# Patient Record
Sex: MALE | Race: BLACK OR AFRICAN AMERICAN | Employment: OTHER | URBAN - METROPOLITAN AREA
[De-identification: names, ages, dates, MRNs, and addresses within clinical notes are randomized per-mention and may not be internally consistent; named-entity substitution may affect disease eponyms.]

---

## 2017-10-23 ENCOUNTER — OFFICE VISIT (OUTPATIENT)
Dept: INTERNAL MEDICINE CLINIC | Facility: CLINIC | Age: 72
End: 2017-10-23

## 2017-10-23 VITALS
SYSTOLIC BLOOD PRESSURE: 136 MMHG | HEIGHT: 67 IN | DIASTOLIC BLOOD PRESSURE: 84 MMHG | WEIGHT: 191 LBS | BODY MASS INDEX: 29.98 KG/M2 | TEMPERATURE: 97 F | HEART RATE: 76 BPM | RESPIRATION RATE: 16 BRPM

## 2017-10-23 DIAGNOSIS — I70.0 ATHEROSCLEROSIS OF AORTA (HCC): ICD-10-CM

## 2017-10-23 DIAGNOSIS — Z00.00 PHYSICAL EXAM, ANNUAL: Primary | ICD-10-CM

## 2017-10-23 DIAGNOSIS — N28.1 KIDNEY CYST, ACQUIRED: ICD-10-CM

## 2017-10-23 DIAGNOSIS — D69.6 THROMBOCYTOPENIA (HCC): ICD-10-CM

## 2017-10-23 DIAGNOSIS — M21.619 BUNION: ICD-10-CM

## 2017-10-23 DIAGNOSIS — E78.2 MIXED HYPERLIPIDEMIA: ICD-10-CM

## 2017-10-23 DIAGNOSIS — K40.90 RIGHT INGUINAL HERNIA: ICD-10-CM

## 2017-10-23 DIAGNOSIS — Z12.5 SCREENING FOR PROSTATE CANCER: ICD-10-CM

## 2017-10-23 PROBLEM — M54.30 SCIATICA: Status: RESOLVED | Noted: 2017-10-23 | Resolved: 2017-10-23

## 2017-10-23 PROCEDURE — G0439 PPPS, SUBSEQ VISIT: HCPCS | Performed by: INTERNAL MEDICINE

## 2017-10-23 NOTE — PROGRESS NOTES
Leigh Angelo is a 74year old male who presents for a Medicare Annual Wellness visit.     Patient Care Team: Patient Care Team:  Christa Mitchell MD as PCP - General (Internal Medicine)    Patient Active Problem List:     Bunion     Special screening lost more than 10 pounds without trying?: 2 - No    Has your appetite been poor?: No    Type of Diet: Balanced    How does the patient maintain a good energy level?: Appropriate Exercise    How would you describe your daily physical activity?: Moderate SERVICES  INDICATIONS AND SCHEDULE Internal Lab or Procedure External Lab or Procedure   Diabetes Screening      HbgA1C   Annually No results found for: A1C No flowsheet data found.     Fasting Blood Sugar (FSB)Annually   GLUCOSE (P) (mg/dL)   Date Value data found. Digoxin Serum Conc  Annually No results found for: DIGOXIN No flowsheet data found. Diabetes      HgbA1C  Annually No results found for: A1C No flowsheet data found.     Creat/alb ratio  Annually      LDL  Annually LDL Cholesterol (mg/dL) > BP Readings from Last 3 Encounters:  10/23/17 : 136/84  07/07/16 : 130/78  07/06/15 : 143/73      GENERAL: well developed, well nourished, in no apparent distress  SKIN: no rashes, no suspicious lesions  HEENT: atraumatic, normocephalic, ears and th strength, burn calories and help to achieve ideal body weight.    - CBC WITH DIFFERENTIAL WITH PLATELET; Future  - COMP METABOLIC PANEL (14); Future  - LIPID PANEL; Future  - ASSAY, THYROID STIM HORMONE; Future  - URINALYSIS, ROUTINE; Future    3.  Thromboc

## 2017-10-26 ENCOUNTER — LAB ENCOUNTER (OUTPATIENT)
Dept: LAB | Facility: HOSPITAL | Age: 72
End: 2017-10-26
Attending: INTERNAL MEDICINE
Payer: MEDICARE

## 2017-10-26 DIAGNOSIS — E78.2 MIXED HYPERLIPIDEMIA: ICD-10-CM

## 2017-10-26 DIAGNOSIS — Z12.5 SCREENING FOR PROSTATE CANCER: ICD-10-CM

## 2017-10-26 PROCEDURE — 81003 URINALYSIS AUTO W/O SCOPE: CPT

## 2017-10-26 PROCEDURE — 80053 COMPREHEN METABOLIC PANEL: CPT

## 2017-10-26 PROCEDURE — 36415 COLL VENOUS BLD VENIPUNCTURE: CPT

## 2017-10-26 PROCEDURE — 84443 ASSAY THYROID STIM HORMONE: CPT

## 2017-10-26 PROCEDURE — 85025 COMPLETE CBC W/AUTO DIFF WBC: CPT

## 2017-10-26 PROCEDURE — 80061 LIPID PANEL: CPT

## 2017-11-02 ENCOUNTER — TELEPHONE (OUTPATIENT)
Dept: OTHER | Age: 72
End: 2017-11-02

## 2017-11-02 DIAGNOSIS — D69.1 ABNORMAL PLATELETS (HCC): Primary | ICD-10-CM

## 2017-11-02 NOTE — TELEPHONE ENCOUNTER
----- Message from Lucía Ramirez RN sent at 11/1/2017 11:45 AM CDT -----      ----- Message -----  From: Jayna Gardner MD  Sent: 11/1/2017   9:56 AM  To: [facility] Lpn/Doris    Blood test is good with normal blood  sugar, liver, thyroid, urine, lipids

## 2017-11-07 ENCOUNTER — OFFICE VISIT (OUTPATIENT)
Dept: SURGERY | Facility: CLINIC | Age: 72
End: 2017-11-07

## 2017-11-07 VITALS — BODY MASS INDEX: 29.98 KG/M2 | WEIGHT: 191 LBS | HEIGHT: 67 IN

## 2017-11-07 DIAGNOSIS — K40.90 RIGHT INGUINAL HERNIA: Primary | ICD-10-CM

## 2017-11-07 PROCEDURE — 99204 OFFICE O/P NEW MOD 45 MIN: CPT | Performed by: SURGERY

## 2017-11-07 PROCEDURE — G0463 HOSPITAL OUTPT CLINIC VISIT: HCPCS | Performed by: SURGERY

## 2017-11-08 ENCOUNTER — TELEPHONE (OUTPATIENT)
Dept: SURGERY | Facility: CLINIC | Age: 72
End: 2017-11-08

## 2017-11-08 NOTE — H&P
History and Physical      Laweraalexi Garcia is a 74year old male. HPI   Patient presents with:  Hernia: Patient referred by PCP Dr. Cynthia Saldana for right inguinal hernia. Patient states he first noticed about 3 monhts ago.  Bulge present, denies changes i hydrated alert and responsive no acute distress noted  Head/Face: normocephalic  Nose/Mouth/Throat: nose and throat are clear palate is intact mucous membranes are moist no oral lesions are noted  Neck/Thyroid: neck is supple without adenopathy  Respirator

## 2017-11-08 NOTE — TELEPHONE ENCOUNTER
LM on identified VM for patient. Spoke with Jesus Hugo at the [facility] who is holding an appt slot with Dr. Sayra Quispe on 11/14/2017 at . Explained that Dr. Everett Masterson is requiring a hematology evaluation prior to scheduled surgery on 11/17/2017.  Requested CB wh

## 2017-11-08 NOTE — TELEPHONE ENCOUNTER
LM on identified VM, received call from cancer center that an appt is available tomorrow at 1300 with Dr. Aiyana Hall tomorrow. Requested call back from patient when able. CB number given.

## 2017-11-08 NOTE — TELEPHONE ENCOUNTER
MD Angeli Rodriguez LPN; Juno Dejesus, FLY; Marissa Canada RN             Hi.  Dr. José Manuel Campa recommended a hematology eval. This should probably be done before hernia repair.  Let's try to facilitate that, and let me know if any problems.

## 2017-11-08 NOTE — TELEPHONE ENCOUNTER
LMTCB 2nd attempt    Please transfer [id number] anytime. Thank you. Hematology (Dr. Saman Cummings) referral pended for pt notification.

## 2017-11-08 NOTE — TELEPHONE ENCOUNTER
Appointment scheduled with Dr. Richi Coelho on 11/14/2017 at  at the cancer center. [place] parking lot. Patient verbalized understanding.

## 2017-11-10 NOTE — TELEPHONE ENCOUNTER
Pt returned call and informed of results and recommendations as per PVR's result note copied below. Pt already has appt with Dr Tenzin Martel on 11/14/17 and denies further questions/concerns at this time.

## 2017-11-10 NOTE — TELEPHONE ENCOUNTER
Please note. [facility] you[facility]  No response letter mailed to address on file. Please transfer [id number] anytime. Thank you.

## 2017-11-14 ENCOUNTER — LAB ENCOUNTER (OUTPATIENT)
Dept: LAB | Facility: HOSPITAL | Age: 72
End: 2017-11-14
Attending: INTERNAL MEDICINE
Payer: MEDICARE

## 2017-11-14 ENCOUNTER — OFFICE VISIT (OUTPATIENT)
Dept: HEMATOLOGY/ONCOLOGY | Facility: HOSPITAL | Age: 72
End: 2017-11-14
Attending: INTERNAL MEDICINE
Payer: MEDICARE

## 2017-11-14 VITALS
SYSTOLIC BLOOD PRESSURE: 132 MMHG | HEIGHT: 67 IN | BODY MASS INDEX: 30.13 KG/M2 | HEART RATE: 61 BPM | TEMPERATURE: 98 F | WEIGHT: 192 LBS | DIASTOLIC BLOOD PRESSURE: 66 MMHG | RESPIRATION RATE: 16 BRPM

## 2017-11-14 DIAGNOSIS — D70.0 CONGENITAL NEUTROPENIA (HCC): ICD-10-CM

## 2017-11-14 DIAGNOSIS — D69.6 THROMBOCYTOPENIA (HCC): ICD-10-CM

## 2017-11-14 DIAGNOSIS — R53.82 CHRONIC FATIGUE: ICD-10-CM

## 2017-11-14 DIAGNOSIS — D69.6 THROMBOCYTOPENIA (HCC): Primary | ICD-10-CM

## 2017-11-14 PROCEDURE — 87389 HIV-1 AG W/HIV-1&-2 AB AG IA: CPT

## 2017-11-14 PROCEDURE — 80074 ACUTE HEPATITIS PANEL: CPT

## 2017-11-14 PROCEDURE — 85025 COMPLETE CBC W/AUTO DIFF WBC: CPT

## 2017-11-14 PROCEDURE — G0463 HOSPITAL OUTPT CLINIC VISIT: HCPCS | Performed by: INTERNAL MEDICINE

## 2017-11-14 PROCEDURE — 85060 BLOOD SMEAR INTERPRETATION: CPT

## 2017-11-14 PROCEDURE — 99204 OFFICE O/P NEW MOD 45 MIN: CPT | Performed by: INTERNAL MEDICINE

## 2017-11-14 PROCEDURE — 36415 COLL VENOUS BLD VENIPUNCTURE: CPT

## 2017-11-14 PROCEDURE — 82746 ASSAY OF FOLIC ACID SERUM: CPT

## 2017-11-14 PROCEDURE — 82607 VITAMIN B-12: CPT

## 2017-11-14 NOTE — PROGRESS NOTES
Hematology Consultation Note    Patient Name: Davonte Hill   YOB: 1945   Medical Record Number: [id number]   CSN: [id number]   Consulting Physician: Les Pulliam MD  Referring Physician(s): Davonte Hill  Date of Consultation: 11/14 History:  Family History   Problem Relation Age of Onset   • Lipids Other    • Vascular disease [OTHER] Other    • Cancer Paternal Uncle      uncertain etiology   • Stroke Paternal Uncle    • Bleeding Disorders Neg    • Clotting Disorder Neg        Social Vital Signs:  /66 (BP Location: Left arm, Patient Position: Sitting, Cuff Size: large)   Pulse 61   Temp 97.8 °F (36.6 °C) (Oral)   Resp 16   Ht 1.702 m (5' 7\")   Wt 87.1 kg (192 lb)   BMI 30.07 kg/m²     Physical Examination:    General: Patien VITAMIN D35, FOLIC ACID         SERUM(FOLATE), CBC WITH DIFFERENTIAL WITH         PLATELET, MD BLOOD SMEAR CONSULT    (R53.82) Chronic fatigue   Plan: HEPATITIS PANEL, ACUTE (4)    (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

## 2017-11-16 ENCOUNTER — TELEPHONE (OUTPATIENT)
Dept: SURGERY | Facility: CLINIC | Age: 72
End: 2017-11-16

## 2018-10-02 ENCOUNTER — TELEPHONE (OUTPATIENT)
Dept: INTERNAL MEDICINE CLINIC | Facility: CLINIC | Age: 73
End: 2018-10-02

## 2018-10-29 ENCOUNTER — OFFICE VISIT (OUTPATIENT)
Dept: INTERNAL MEDICINE CLINIC | Facility: CLINIC | Age: 73
End: 2018-10-29
Payer: MEDICARE

## 2018-10-29 ENCOUNTER — LAB ENCOUNTER (OUTPATIENT)
Dept: LAB | Facility: HOSPITAL | Age: 73
End: 2018-10-29
Attending: PHYSICIAN ASSISTANT
Payer: MEDICARE

## 2018-10-29 VITALS
WEIGHT: 193.63 LBS | HEART RATE: 63 BPM | DIASTOLIC BLOOD PRESSURE: 83 MMHG | HEIGHT: 68.5 IN | BODY MASS INDEX: 29.01 KG/M2 | SYSTOLIC BLOOD PRESSURE: 139 MMHG

## 2018-10-29 DIAGNOSIS — D69.6 THROMBOCYTOPENIA (HCC): ICD-10-CM

## 2018-10-29 DIAGNOSIS — Z12.5 SCREENING FOR PROSTATE CANCER: ICD-10-CM

## 2018-10-29 DIAGNOSIS — I70.0 ATHEROSCLEROSIS OF AORTA (HCC): ICD-10-CM

## 2018-10-29 DIAGNOSIS — N28.1 KIDNEY CYST, ACQUIRED: ICD-10-CM

## 2018-10-29 DIAGNOSIS — E78.2 MIXED HYPERLIPIDEMIA: ICD-10-CM

## 2018-10-29 DIAGNOSIS — Z12.11 COLON CANCER SCREENING: ICD-10-CM

## 2018-10-29 DIAGNOSIS — Z00.00 ENCOUNTER FOR ANNUAL HEALTH EXAMINATION: Primary | ICD-10-CM

## 2018-10-29 DIAGNOSIS — M21.619 BUNION: ICD-10-CM

## 2018-10-29 DIAGNOSIS — Z00.00 ENCOUNTER FOR ANNUAL HEALTH EXAMINATION: ICD-10-CM

## 2018-10-29 DIAGNOSIS — K40.90 RIGHT INGUINAL HERNIA: ICD-10-CM

## 2018-10-29 PROCEDURE — 81001 URINALYSIS AUTO W/SCOPE: CPT

## 2018-10-29 PROCEDURE — 80053 COMPREHEN METABOLIC PANEL: CPT

## 2018-10-29 PROCEDURE — G0439 PPPS, SUBSEQ VISIT: HCPCS | Performed by: PHYSICIAN ASSISTANT

## 2018-10-29 PROCEDURE — 80061 LIPID PANEL: CPT

## 2018-10-29 PROCEDURE — 85025 COMPLETE CBC W/AUTO DIFF WBC: CPT

## 2018-10-29 PROCEDURE — 36415 COLL VENOUS BLD VENIPUNCTURE: CPT

## 2018-10-29 PROCEDURE — 84443 ASSAY THYROID STIM HORMONE: CPT

## 2018-10-29 NOTE — PROGRESS NOTES
HPI:   Yehuda Soulier is a 74year old male who presents for a Medicare Subsequent Annual Wellness visit (Pt already had Initial Annual Wellness).     Patient with history of hyperlipidemia and thrombocytopenia presents today for Medicare physical. His l but we do not have it in [facility], and patient is instructed to get our office a copy of it for scanning into Epic. He does NOT have a Power of  for [employer] on file in [place].    Advance care planning including the explanation and discussion o dental surgery procedure (2015). His family history includes Cancer in his paternal uncle; Lipids in an other family member; Stroke in his paternal uncle; Vascular disease in an other family member.    SOCIAL HISTORY:   He  reports that  has never smoked have trouble understanding things on the TV:  No I have to strain to understand conversations:  No   I have to worry about missing the telephone ring or doorbell:  No I have trouble hearing conversations in a noisy background such as a crowded room or rest Effort normal and breath sounds normal. He has no wheezes. He has no rales. Abdominal: Soft. Bowel sounds are normal. He exhibits no distension and no mass. There is no hepatosplenomegaly. There is no tenderness. No hernia.    Genitourinary: Rectum normal as needed. -     CBC WITH DIFFERENTIAL WITH PLATELET; Future    Screening for prostate cancer  CECE exam unremarkable. Will check PSA and contact patient with results.    -     PSA SCREEN; Future    Kidney cyst, acquired  Incidental left renal cyst seen on energy level?: Daily Walks  How would you describe your daily physical activity?: Moderate  How would you describe your current health state?: Good  How do you maintain positive mental well-being?: Visiting Friends    This section provided for quick review Hemophiliacs who received Factor VIII or IX concentrates   Clients of institutions for the mentally retarded   Persons who live in the same house as a HepB virus carrier   Homosexual men   Illicit injectable drug abusers     Tetanus Toxoid  Only covered

## 2018-10-29 NOTE — PATIENT INSTRUCTIONS
Pepito Mendoza's SCREENING SCHEDULE   Tests on this list are recommended by your physician but may not be covered, or covered at this frequency, by your insurer. Please check with your insurance carrier before scheduling to verify coverage.     BARNEY Cancer Screening Covered up to Age 76     Colonoscopy Screen   Covered every 10 years- more often if abnormal Colonoscopy due on 01/19/1945 Update Health Maintenance if applicable    Flex Sigmoidoscopy Screen  Covered every 5 years No results found for [facility] for this or any previous visit. This may be covered with your prescription benefits, but Medicare does not cover unless Medically needed    Zoster (Not covered by Medicare Part B) No orders found for this or any previous visit.  This may be covered with you GLUCOSE (P) (mg/dL)   Date Value   07/14/2016 100 (H)    Medicare covers annually or at 6-month intervals for prediabetic patients        Cardiovascular Disease Screening     Cholesterol, covered every 5 yrs including Total, LDL and Trigs LDL Cholester over), CECE not paid separately when covered E/M service is provided on same date    Immunizations      Influenza  Covered Annually Orders placed or performed in visit on 10/27/14   • FLU VACC PRSV FREE INC ANTIG    Please get every year    Pneumococcal [facility] regarding Advance Directives.

## 2019-11-14 ENCOUNTER — OFFICE VISIT (OUTPATIENT)
Dept: INTERNAL MEDICINE CLINIC | Facility: CLINIC | Age: 74
End: 2019-11-14
Payer: MEDICARE

## 2019-11-14 VITALS
SYSTOLIC BLOOD PRESSURE: 130 MMHG | RESPIRATION RATE: 16 BRPM | DIASTOLIC BLOOD PRESSURE: 76 MMHG | BODY MASS INDEX: 29.66 KG/M2 | HEIGHT: 67 IN | WEIGHT: 189 LBS | HEART RATE: 54 BPM

## 2019-11-14 DIAGNOSIS — Z12.11 SCREENING FOR COLORECTAL CANCER: ICD-10-CM

## 2019-11-14 DIAGNOSIS — Z00.00 PHYSICAL EXAM, ANNUAL: Primary | ICD-10-CM

## 2019-11-14 DIAGNOSIS — Z12.12 SCREENING FOR COLORECTAL CANCER: ICD-10-CM

## 2019-11-14 DIAGNOSIS — Z23 NEED FOR VACCINATION: ICD-10-CM

## 2019-11-14 DIAGNOSIS — Z12.5 SCREENING FOR PROSTATE CANCER: ICD-10-CM

## 2019-11-14 DIAGNOSIS — E78.2 MIXED HYPERLIPIDEMIA: ICD-10-CM

## 2019-11-14 PROCEDURE — 90670 PCV13 VACCINE IM: CPT | Performed by: INTERNAL MEDICINE

## 2019-11-14 PROCEDURE — G0439 PPPS, SUBSEQ VISIT: HCPCS | Performed by: INTERNAL MEDICINE

## 2019-11-14 PROCEDURE — G0009 ADMIN PNEUMOCOCCAL VACCINE: HCPCS | Performed by: INTERNAL MEDICINE

## 2019-11-14 PROCEDURE — G0463 HOSPITAL OUTPT CLINIC VISIT: HCPCS | Performed by: INTERNAL MEDICINE

## 2019-11-14 NOTE — PROGRESS NOTES
REASON FOR VISIT:    Que Kemp is a 66year old male who presents for a Medicare Annual Wellness visit.     Patient Care Team: Patient Care Team:  Alma Delia Corona MD as PCP - General (Internal Medicine)    Patient Active Problem List:     Rosie Knight level?: Appropriate Exercise  How would you describe your daily physical activity?: Moderate  How would you describe your current health state?: Good  How do you maintain positive mental well-being?: Social Interaction; Visiting Family  If you are a male ag Correct  Recall \"Flag\": Correct  Recall \"Tree\": Correct         PREVENTATIVE SERVICES   INDICATIONS AND SCHEDULE Internal Lab or Procedure   Diabetes Screening     HbgA1C   Annually No results found for: A1C    Fasting Blood Sugar (FSB)Annually Glucose Smoking status: Never Smoker      Smokeless tobacco: Never Used    Alcohol use: Yes      Comment: fomer heavy alcohol use; now no longer drinks    Drug use: No       REVIEW OF SYSTEMS:     GENERAL: feels well otherwise  SKIN: denies any unusual skin lesion visit: 1. Physical exam, annual    Healthy physical examination. Fasting blood test ordered. Advised to exercise regularly, monitor diet and weight, and follow up as directed.  Get regular screening colonoscopies every 8years of age unless a positive

## 2019-11-18 ENCOUNTER — LAB ENCOUNTER (OUTPATIENT)
Dept: LAB | Facility: HOSPITAL | Age: 74
End: 2019-11-18
Attending: INTERNAL MEDICINE
Payer: MEDICARE

## 2019-11-18 DIAGNOSIS — Z12.5 SCREENING FOR PROSTATE CANCER: ICD-10-CM

## 2019-11-18 DIAGNOSIS — E78.2 MIXED HYPERLIPIDEMIA: ICD-10-CM

## 2019-11-18 PROCEDURE — 80053 COMPREHEN METABOLIC PANEL: CPT

## 2019-11-18 PROCEDURE — 36415 COLL VENOUS BLD VENIPUNCTURE: CPT

## 2019-11-18 PROCEDURE — 84443 ASSAY THYROID STIM HORMONE: CPT

## 2019-11-18 PROCEDURE — 85025 COMPLETE CBC W/AUTO DIFF WBC: CPT

## 2019-11-18 PROCEDURE — 81001 URINALYSIS AUTO W/SCOPE: CPT

## 2019-11-18 PROCEDURE — 80061 LIPID PANEL: CPT

## 2019-11-25 RX ORDER — SIMVASTATIN 20 MG
20 TABLET ORAL NIGHTLY
Qty: 90 TABLET | Refills: 1 | Status: CANCELLED | OUTPATIENT
Start: 2019-11-25

## 2021-02-12 ENCOUNTER — OFFICE VISIT (OUTPATIENT)
Dept: INTERNAL MEDICINE CLINIC | Facility: CLINIC | Age: 76
End: 2021-02-12
Payer: MEDICARE

## 2021-02-12 VITALS
SYSTOLIC BLOOD PRESSURE: 163 MMHG | RESPIRATION RATE: 16 BRPM | BODY MASS INDEX: 28.88 KG/M2 | HEIGHT: 67 IN | DIASTOLIC BLOOD PRESSURE: 84 MMHG | HEART RATE: 64 BPM | WEIGHT: 184 LBS

## 2021-02-12 DIAGNOSIS — M54.31 RIGHT SCIATIC NERVE PAIN: Primary | ICD-10-CM

## 2021-02-12 DIAGNOSIS — I10 ESSENTIAL HYPERTENSION: ICD-10-CM

## 2021-02-12 PROCEDURE — 99214 OFFICE O/P EST MOD 30 MIN: CPT | Performed by: NURSE PRACTITIONER

## 2021-02-12 RX ORDER — PREDNISONE 10 MG/1
TABLET ORAL
Qty: 30 TABLET | Refills: 0 | Status: SHIPPED | OUTPATIENT
Start: 2021-02-12 | End: 2021-03-09 | Stop reason: ALTCHOICE

## 2021-02-12 NOTE — PROGRESS NOTES
HPI:    Patient ID: Farhana Agustin is a 64year old male. HPI Right lower buttock pain radiating down right leg. Hypertension  Pain is intermittent and started several weeks ago.    Patient describes the pain as a sharp pain moderate intensity that radi [employer]        Comment: retired    Tobacco Use      Smoking status: Never Smoker      Smokeless tobacco: Never Used    Substance and Sexual Activity      Alcohol use: Yes        Comment: christian heavy alcohol use; now no longer drinks      Drug use moist.   Eyes: Pupils are equal, round, and reactive to light. Neck: No thyromegaly present. Cardiovascular: Normal rate, regular rhythm, normal heart sounds and intact distal pulses. Exam reveals no gallop and no friction rub. No murmur heard.   Pu low-salt choices given to patient. 2) Patient instructed not to salt his foods when he is cooking and he may use a salt substitute.   3) Patient instructed to return in 1 month for blood pressure check and to follow with PCP for annual physical and annual

## 2021-02-12 NOTE — ASSESSMENT & PLAN NOTE
A/P Hypertension. Patient denies any history of hypertension but his blood pressure was quite high when he arrived 163/84. I took it manually towards the end of the appointment it was 140/80.   Patient was counseled on a low-salt diet and references were

## 2021-02-12 NOTE — ASSESSMENT & PLAN NOTE
A/P 25-year-old male who complains of right buttocks pain that radiates down the back of his leg. He states the pain is intermittent and sharp and moderate in intensity. He has not taken anything for the pain and does not take any of his medications.

## 2021-03-08 DIAGNOSIS — Z23 NEED FOR VACCINATION: ICD-10-CM

## 2021-03-09 ENCOUNTER — OFFICE VISIT (OUTPATIENT)
Dept: INTERNAL MEDICINE CLINIC | Facility: CLINIC | Age: 76
End: 2021-03-09
Payer: MEDICARE

## 2021-03-09 ENCOUNTER — HOSPITAL ENCOUNTER (OUTPATIENT)
Dept: GENERAL RADIOLOGY | Facility: HOSPITAL | Age: 76
Discharge: HOME | End: 2021-03-09
Attending: NURSE PRACTITIONER
Payer: MEDICARE

## 2021-03-09 VITALS
SYSTOLIC BLOOD PRESSURE: 138 MMHG | HEART RATE: 61 BPM | BODY MASS INDEX: 29.06 KG/M2 | DIASTOLIC BLOOD PRESSURE: 79 MMHG | HEIGHT: 67 IN | RESPIRATION RATE: 16 BRPM | WEIGHT: 185.13 LBS

## 2021-03-09 DIAGNOSIS — M54.31 RIGHT SCIATIC NERVE PAIN: ICD-10-CM

## 2021-03-09 DIAGNOSIS — I10 ESSENTIAL HYPERTENSION: ICD-10-CM

## 2021-03-09 DIAGNOSIS — M54.31 RIGHT SCIATIC NERVE PAIN: Primary | ICD-10-CM

## 2021-03-09 PROCEDURE — 72110 X-RAY EXAM L-2 SPINE 4/>VWS: CPT | Performed by: NURSE PRACTITIONER

## 2021-03-09 PROCEDURE — 3008F BODY MASS INDEX DOCD: CPT | Performed by: NURSE PRACTITIONER

## 2021-03-09 PROCEDURE — 3078F DIAST BP <80 MM HG: CPT | Performed by: NURSE PRACTITIONER

## 2021-03-09 PROCEDURE — 99214 OFFICE O/P EST MOD 30 MIN: CPT | Performed by: NURSE PRACTITIONER

## 2021-03-09 PROCEDURE — 3075F SYST BP GE 130 - 139MM HG: CPT | Performed by: NURSE PRACTITIONER

## 2021-03-09 RX ORDER — GABAPENTIN 100 MG/1
CAPSULE ORAL
Qty: 120 CAPSULE | Refills: 3 | Status: SHIPPED | OUTPATIENT
Start: 2021-03-09 | End: 2022-01-20

## 2021-03-09 RX ORDER — SIMVASTATIN 20 MG
20 TABLET ORAL NIGHTLY
Qty: 90 TABLET | Refills: 3 | Status: SHIPPED | OUTPATIENT
Start: 2021-03-09 | End: 2022-01-20

## 2021-03-09 NOTE — PATIENT INSTRUCTIONS
Please take ibuprofen 400mg every eight hours x 3 days. Ice lower back 15 to 20 minutes four times a day.

## 2021-03-09 NOTE — ASSESSMENT & PLAN NOTE
A/P 43-year-old male I saw for lower back pain rating down his right leg he also had elevated blood pressure at his last visit 163/84. Patient states he gave up soft and Ketchup   and his blood pressure at this visit is 138/79.       Plan  Continue followi

## 2021-03-09 NOTE — ASSESSMENT & PLAN NOTE
A/P 9year-old pleasant gentleman complaining of pain down his right leg. Pain level is 3/10. When you move it pain increased to 8/10. Patient was given a prednisone taper and pain continues to radiate down the right leg all the  to his toes.   Had some

## 2021-03-09 NOTE — PROGRESS NOTES
HPI:    Patient ID: Helane Lundborg is a 64year old male. HPI Hypertension, Right Sciatic Nerve pain. Right Sciatic Pain  74year-old pleasant gentleman complaining of pain down his right leg. Pain level is 3/10.   When you move it pain increased to 8 Years of education: Not on file      Highest education level: Not on file    Occupational History      Occupation: production        Employer: [address] [place] [address] Street: retired    Tobacco Use      Smoking status: Never Smoker      Smokeless tobac abdominal pain, constipation, diarrhea, nausea and vomiting. Endocrine: Negative for cold intolerance and heat intolerance. Genitourinary: Negative for dysuria and hematuria. Musculoskeletal: Positive for back pain. Negative for joint swelling. This Visit        Unprioritized    Right sciatic nerve pain - Primary     A/P 9year-old pleasant gentleman complaining of pain down his right leg. Pain level is 3/10. When you move it pain increased to 8/10.   Patient was given a prednisone taper and yvonne

## 2021-03-10 ENCOUNTER — TELEPHONE (OUTPATIENT)
Dept: INTERNAL MEDICINE CLINIC | Facility: CLINIC | Age: 76
End: 2021-03-10

## 2021-03-10 NOTE — TELEPHONE ENCOUNTER
Pt states he received a call from the clinic stating they have a vaccine available for him. I reviewed Pt's chart, ticket generated for 1st covid-19 vaccine. Informed Pt there are 2 locations for the vaccines. Assisted Pt to schedule 1st dose.  Only jacqueline

## 2021-03-11 ENCOUNTER — TELEPHONE (OUTPATIENT)
Dept: PHYSICAL THERAPY | Facility: HOSPITAL | Age: 76
End: 2021-03-11

## 2021-03-12 ENCOUNTER — APPOINTMENT (OUTPATIENT)
Dept: PHYSICAL THERAPY | Facility: HOSPITAL | Age: 76
End: 2021-03-12
Attending: NURSE PRACTITIONER
Payer: MEDICARE

## 2021-03-12 ENCOUNTER — TELEPHONE (OUTPATIENT)
Dept: PHYSICAL THERAPY | Facility: HOSPITAL | Age: 76
End: 2021-03-12

## 2021-03-15 ENCOUNTER — OFFICE VISIT (OUTPATIENT)
Dept: PHYSICAL THERAPY | Facility: HOSPITAL | Age: 76
End: 2021-03-15
Attending: NURSE PRACTITIONER
Payer: MEDICARE

## 2021-03-15 DIAGNOSIS — M54.31 RIGHT SCIATIC NERVE PAIN: ICD-10-CM

## 2021-03-15 PROCEDURE — 97162 PT EVAL MOD COMPLEX 30 MIN: CPT

## 2021-03-15 PROCEDURE — 97110 THERAPEUTIC EXERCISES: CPT

## 2021-03-15 NOTE — PROGRESS NOTES
SPINE EVALUATION:   Referring Physician: Dr. Bairon Webb  Diagnosis: Right sciatic nerve pain (M54.31)     Date of Service: 3/15/2021     PATIENT SUMMARY   Devonte Love is a 64year old male who presents to therapy today with complaints of LBP that radiates ([facility] [address]        Pt denies drop attacks, bowel/bladder changes, saddle anesthesia, and FREDERICK LE N/T.    Cosmo Foster presents to physical therapy evaluation with primary c/o R posterior buttock pain and pain/paresthesia in posterior R LE.  The results of the 5/5   DF (L4): R 5/5; L 5/5  Great Toe Ext (L5): R 5/5, L 5/5  PF (S1): R 5/5; L 5/5     Flexibility:   LE   Hamstrings: R mod tightness; L min tightness  Piriformis: R mod tightness; L min tightness     Special tests:   +SLR on R    Gait: pt ambulates on total of 10 visits over a 90 day period.  Treatment will include: Manual Therapy, Neuromuscular Re-education, Self-Care Home Management, Therapeutic Activities, Therapeutic Exercise, Home Exercise Program instruction and Modalities to include: Electrical st

## 2021-03-17 ENCOUNTER — OFFICE VISIT (OUTPATIENT)
Dept: PHYSICAL THERAPY | Facility: HOSPITAL | Age: 76
End: 2021-03-17
Attending: NURSE PRACTITIONER
Payer: MEDICARE

## 2021-03-17 PROCEDURE — 97140 MANUAL THERAPY 1/> REGIONS: CPT

## 2021-03-17 PROCEDURE — 97110 THERAPEUTIC EXERCISES: CPT

## 2021-03-17 NOTE — PROGRESS NOTES
Dx: Right sciatic nerve pain (M54.31)           Insurance (Authorized # of Visits):  Ev Rivero (10 per POC)          Authorizing Physician: Dr. Arnel Lucio  Next MD visit: April 2021    Fall Risk: standard         Precautions: n/a             Subjective: Pt re Treatment: 42 min  Total Treatment Time: 42 min

## 2021-03-19 ENCOUNTER — TELEPHONE (OUTPATIENT)
Dept: PHYSICAL THERAPY | Facility: HOSPITAL | Age: 76
End: 2021-03-19

## 2021-03-19 NOTE — TELEPHONE ENCOUNTER
LM for pt about opening on Monday if he would like to schedule appt. Asked him to call back as soon as possible if he would like to schedule.

## 2021-03-22 ENCOUNTER — TELEPHONE (OUTPATIENT)
Dept: PHYSICAL THERAPY | Facility: HOSPITAL | Age: 76
End: 2021-03-22

## 2021-03-22 NOTE — TELEPHONE ENCOUNTER
LM for pt about openings on schedule this week. Asked pt to call back if he would like to schedule any appts.

## 2021-03-25 ENCOUNTER — NURSE TRIAGE (OUTPATIENT)
Dept: INTERNAL MEDICINE CLINIC | Facility: CLINIC | Age: 76
End: 2021-03-25

## 2021-03-25 NOTE — TELEPHONE ENCOUNTER
Patient requesting follow up appt for sciatic pain to right leg last visit 3/9/21, has been doing PT but not helping, reports pain begins in buttock and goes down to his feet. Feels his pain is getting worse. Appt scheduled for 4/1 with Dr Angi Webb.

## 2021-03-29 ENCOUNTER — TELEPHONE (OUTPATIENT)
Dept: PHYSICAL THERAPY | Facility: HOSPITAL | Age: 76
End: 2021-03-29

## 2021-03-31 ENCOUNTER — APPOINTMENT (OUTPATIENT)
Dept: PHYSICAL THERAPY | Facility: HOSPITAL | Age: 76
End: 2021-03-31
Attending: NURSE PRACTITIONER
Payer: MEDICARE

## 2021-04-01 ENCOUNTER — OFFICE VISIT (OUTPATIENT)
Dept: INTERNAL MEDICINE CLINIC | Facility: CLINIC | Age: 76
End: 2021-04-01
Payer: MEDICARE

## 2021-04-01 VITALS
BODY MASS INDEX: 30.86 KG/M2 | SYSTOLIC BLOOD PRESSURE: 151 MMHG | HEART RATE: 47 BPM | DIASTOLIC BLOOD PRESSURE: 84 MMHG | HEIGHT: 66 IN | RESPIRATION RATE: 16 BRPM | WEIGHT: 192 LBS

## 2021-04-01 DIAGNOSIS — M54.41 CHRONIC RIGHT-SIDED LOW BACK PAIN WITH RIGHT-SIDED SCIATICA: Primary | ICD-10-CM

## 2021-04-01 DIAGNOSIS — E78.2 MIXED HYPERLIPIDEMIA: ICD-10-CM

## 2021-04-01 DIAGNOSIS — G89.29 CHRONIC RIGHT-SIDED LOW BACK PAIN WITH RIGHT-SIDED SCIATICA: Primary | ICD-10-CM

## 2021-04-01 DIAGNOSIS — I10 ESSENTIAL HYPERTENSION: ICD-10-CM

## 2021-04-01 PROCEDURE — 99214 OFFICE O/P EST MOD 30 MIN: CPT | Performed by: INTERNAL MEDICINE

## 2021-04-01 PROCEDURE — 3077F SYST BP >= 140 MM HG: CPT | Performed by: INTERNAL MEDICINE

## 2021-04-01 PROCEDURE — 3008F BODY MASS INDEX DOCD: CPT | Performed by: INTERNAL MEDICINE

## 2021-04-01 PROCEDURE — 3079F DIAST BP 80-89 MM HG: CPT | Performed by: INTERNAL MEDICINE

## 2021-04-01 NOTE — PROGRESS NOTES
Josseline Becerra is a 64year old male. HPI:     1. 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  Essential hypertension    Patient instructed to take anti-hypertensive medicines exactly as prescribed and to follow a low salt diet a clear  NECK: supple,no adenopathy,no bruits  LUNGS: clear to auscultation  CARDIO: RRR without murmur  GI: good BS's,no masses, HSM or tenderness  EXTREMITIES: no cyanosis, clubbing or edema    ASSESSMENT AND PLAN:   1.  Chronic right-sided low back pain wi

## 2021-04-02 ENCOUNTER — APPOINTMENT (OUTPATIENT)
Dept: PHYSICAL THERAPY | Facility: HOSPITAL | Age: 76
End: 2021-04-02
Attending: NURSE PRACTITIONER
Payer: MEDICARE

## 2021-04-05 ENCOUNTER — APPOINTMENT (OUTPATIENT)
Dept: PHYSICAL THERAPY | Facility: HOSPITAL | Age: 76
End: 2021-04-05
Attending: NURSE PRACTITIONER
Payer: MEDICARE

## 2021-04-06 ENCOUNTER — APPOINTMENT (OUTPATIENT)
Dept: PHYSICAL THERAPY | Facility: HOSPITAL | Age: 76
End: 2021-04-06
Attending: NURSE PRACTITIONER
Payer: MEDICARE

## 2021-04-09 ENCOUNTER — APPOINTMENT (OUTPATIENT)
Dept: PHYSICAL THERAPY | Facility: HOSPITAL | Age: 76
End: 2021-04-09
Attending: NURSE PRACTITIONER
Payer: MEDICARE

## 2021-04-13 ENCOUNTER — APPOINTMENT (OUTPATIENT)
Dept: PHYSICAL THERAPY | Facility: HOSPITAL | Age: 76
End: 2021-04-13
Attending: NURSE PRACTITIONER
Payer: MEDICARE

## 2021-04-16 ENCOUNTER — APPOINTMENT (OUTPATIENT)
Dept: PHYSICAL THERAPY | Facility: HOSPITAL | Age: 76
End: 2021-04-16
Attending: NURSE PRACTITIONER
Payer: MEDICARE

## 2021-04-20 ENCOUNTER — APPOINTMENT (OUTPATIENT)
Dept: PHYSICAL THERAPY | Facility: HOSPITAL | Age: 76
End: 2021-04-20
Attending: NURSE PRACTITIONER
Payer: MEDICARE

## 2021-04-23 ENCOUNTER — APPOINTMENT (OUTPATIENT)
Dept: PHYSICAL THERAPY | Facility: HOSPITAL | Age: 76
End: 2021-04-23
Attending: NURSE PRACTITIONER
Payer: MEDICARE

## 2021-04-27 ENCOUNTER — APPOINTMENT (OUTPATIENT)
Dept: PHYSICAL THERAPY | Facility: HOSPITAL | Age: 76
End: 2021-04-27
Attending: NURSE PRACTITIONER
Payer: MEDICARE

## 2021-04-30 ENCOUNTER — APPOINTMENT (OUTPATIENT)
Dept: PHYSICAL THERAPY | Facility: HOSPITAL | Age: 76
End: 2021-04-30
Attending: NURSE PRACTITIONER
Payer: MEDICARE

## 2021-10-22 ENCOUNTER — TELEPHONE (OUTPATIENT)
Dept: INTERNAL MEDICINE CLINIC | Facility: CLINIC | Age: 76
End: 2021-10-22

## 2021-11-26 ENCOUNTER — TELEPHONE (OUTPATIENT)
Dept: CASE MANAGEMENT | Age: 76
End: 2021-11-26

## 2021-12-06 ENCOUNTER — TELEPHONE (OUTPATIENT)
Dept: INTERNAL MEDICINE CLINIC | Facility: CLINIC | Age: 76
End: 2021-12-06

## 2022-01-20 ENCOUNTER — OFFICE VISIT (OUTPATIENT)
Dept: INTERNAL MEDICINE CLINIC | Facility: CLINIC | Age: 77
End: 2022-01-20
Payer: MEDICARE

## 2022-01-20 VITALS
DIASTOLIC BLOOD PRESSURE: 100 MMHG | SYSTOLIC BLOOD PRESSURE: 154 MMHG | HEART RATE: 62 BPM | HEIGHT: 66 IN | RESPIRATION RATE: 14 BRPM | OXYGEN SATURATION: 97 % | BODY MASS INDEX: 30.53 KG/M2 | WEIGHT: 190 LBS

## 2022-01-20 DIAGNOSIS — I10 ESSENTIAL HYPERTENSION: Primary | ICD-10-CM

## 2022-01-20 DIAGNOSIS — Z12.5 SCREENING PSA (PROSTATE SPECIFIC ANTIGEN): ICD-10-CM

## 2022-01-20 DIAGNOSIS — Z00.00 ADULT GENERAL MEDICAL EXAM: ICD-10-CM

## 2022-01-20 DIAGNOSIS — R73.02 IMPAIRED GLUCOSE TOLERANCE: ICD-10-CM

## 2022-01-20 DIAGNOSIS — N28.1 KIDNEY CYST, ACQUIRED: ICD-10-CM

## 2022-01-20 DIAGNOSIS — G89.29 CHRONIC RIGHT-SIDED LOW BACK PAIN WITH RIGHT-SIDED SCIATICA: ICD-10-CM

## 2022-01-20 DIAGNOSIS — M54.41 CHRONIC RIGHT-SIDED LOW BACK PAIN WITH RIGHT-SIDED SCIATICA: ICD-10-CM

## 2022-01-20 DIAGNOSIS — M21.619 BUNION: ICD-10-CM

## 2022-01-20 DIAGNOSIS — I70.0 ATHEROSCLEROSIS OF AORTA (HCC): ICD-10-CM

## 2022-01-20 DIAGNOSIS — D69.6 THROMBOCYTOPENIA (HCC): ICD-10-CM

## 2022-01-20 DIAGNOSIS — E78.2 MIXED HYPERLIPIDEMIA: ICD-10-CM

## 2022-01-20 DIAGNOSIS — Z23 NEED FOR PNEUMOCOCCAL VACCINATION: ICD-10-CM

## 2022-01-20 PROBLEM — K40.90 RIGHT INGUINAL HERNIA: Status: RESOLVED | Noted: 2017-10-23 | Resolved: 2022-01-20

## 2022-01-20 PROBLEM — M54.31 RIGHT SCIATIC NERVE PAIN: Status: RESOLVED | Noted: 2017-10-23 | Resolved: 2022-01-20

## 2022-01-20 PROCEDURE — 99397 PER PM REEVAL EST PAT 65+ YR: CPT | Performed by: INTERNAL MEDICINE

## 2022-01-20 PROCEDURE — 90732 PPSV23 VACC 2 YRS+ SUBQ/IM: CPT | Performed by: INTERNAL MEDICINE

## 2022-01-20 PROCEDURE — G0439 PPPS, SUBSEQ VISIT: HCPCS | Performed by: INTERNAL MEDICINE

## 2022-01-20 PROCEDURE — 3080F DIAST BP >= 90 MM HG: CPT | Performed by: INTERNAL MEDICINE

## 2022-01-20 PROCEDURE — 3077F SYST BP >= 140 MM HG: CPT | Performed by: INTERNAL MEDICINE

## 2022-01-20 PROCEDURE — 3008F BODY MASS INDEX DOCD: CPT | Performed by: INTERNAL MEDICINE

## 2022-01-20 PROCEDURE — G0009 ADMIN PNEUMOCOCCAL VACCINE: HCPCS | Performed by: INTERNAL MEDICINE

## 2022-01-20 PROCEDURE — 96160 PT-FOCUSED HLTH RISK ASSMT: CPT | Performed by: INTERNAL MEDICINE

## 2022-01-20 RX ORDER — AMLODIPINE BESYLATE 10 MG/1
10 TABLET ORAL DAILY
Qty: 90 TABLET | Refills: 3 | Status: SHIPPED | OUTPATIENT
Start: 2022-01-20 | End: 2022-04-20

## 2022-01-20 NOTE — PROGRESS NOTES
Subjective:   Dee Dee Gonzalez is a 76year old male who presents for a MA (Medicare Advantage) Supervisit (Once per calendar year). 57-year-old gentleman here for Medicare advantage super visit. He reports that he is doing well.   He denies any complaint surgical history that includes dental surgery procedure (2015). Family History:  His family history includes Cancer in his paternal uncle; Lipids in an other family member; Stroke in his paternal uncle; Vascular disease in an other family member.   Social icterus. Conjunctiva/sclera: Conjunctivae normal.   Neck:      Vascular: No carotid bruit. Cardiovascular:      Rate and Rhythm: Normal rate and regular rhythm. Pulses: Normal pulses. Heart sounds: Normal heart sounds. No murmur heard. naty: No I have trouble hearing conversations in a noisy background such as a crowded room or restaurant: Sometimes   I get confused about where sounds come from: No I misunderstand some words in a sentence and need to ask people to repeat themselves: Chronic right-sided low back pain with right-sided sciatica resolved  7. Kidney cyst, acquired stable  8. Adult general medical exam       Encourage patient to eat healthy with fruits and vegetables. Avoid too much of sweets and carbohydrates.   I encourag feel concerned for the safety/well-being of yourself and/or your children, in your home or elsewhere?: No  Have you had any immunizations at another office such as Influenza, Hepatitis B, Tetanus, or Pneumococcal?: No        [facility] Annually Lab Results   Component Value Date    PSA 0.8 10/29/2018     There are no preventive care reminders to display for this patient.    Immunizations    Influenza Covered once per flu season  Please get every year -  No recommendations at this time

## 2022-02-10 ENCOUNTER — LAB ENCOUNTER (OUTPATIENT)
Dept: LAB | Facility: HOSPITAL | Age: 77
End: 2022-02-10
Attending: INTERNAL MEDICINE
Payer: MEDICARE

## 2022-02-10 ENCOUNTER — OFFICE VISIT (OUTPATIENT)
Dept: INTERNAL MEDICINE CLINIC | Facility: CLINIC | Age: 77
End: 2022-02-10
Payer: MEDICARE

## 2022-02-10 VITALS
SYSTOLIC BLOOD PRESSURE: 138 MMHG | HEIGHT: 66 IN | BODY MASS INDEX: 30.53 KG/M2 | WEIGHT: 190 LBS | HEART RATE: 58 BPM | OXYGEN SATURATION: 98 % | RESPIRATION RATE: 14 BRPM | DIASTOLIC BLOOD PRESSURE: 80 MMHG

## 2022-02-10 DIAGNOSIS — E78.2 MIXED HYPERLIPIDEMIA: ICD-10-CM

## 2022-02-10 DIAGNOSIS — Z12.5 SCREENING PSA (PROSTATE SPECIFIC ANTIGEN): ICD-10-CM

## 2022-02-10 DIAGNOSIS — I10 ESSENTIAL HYPERTENSION: Primary | ICD-10-CM

## 2022-02-10 DIAGNOSIS — R73.02 IMPAIRED GLUCOSE TOLERANCE: ICD-10-CM

## 2022-02-10 DIAGNOSIS — I10 ESSENTIAL HYPERTENSION: ICD-10-CM

## 2022-02-10 LAB
ALBUMIN SERPL-MCNC: 3.9 G/DL (ref 3.4–5)
ALBUMIN/GLOB SERPL: 1.1 {RATIO} (ref 1–2)
ALP LIVER SERPL-CCNC: 66 U/L
ALT SERPL-CCNC: 35 U/L
ANION GAP SERPL CALC-SCNC: 6 MMOL/L (ref 0–18)
AST SERPL-CCNC: 27 U/L (ref 15–37)
BILIRUB SERPL-MCNC: 0.6 MG/DL (ref 0.1–2)
BUN BLD-MCNC: 13 MG/DL (ref 7–18)
BUN/CREAT SERPL: 11.6 (ref 10–20)
CALCIUM BLD-MCNC: 9.3 MG/DL (ref 8.5–10.1)
CHLORIDE SERPL-SCNC: 108 MMOL/L (ref 98–112)
CHOLEST SERPL-MCNC: 220 MG/DL (ref ?–200)
CO2 SERPL-SCNC: 29 MMOL/L (ref 21–32)
COMPLEXED PSA SERPL-MCNC: 0.92 NG/ML (ref ?–4)
CREAT BLD-MCNC: 1.12 MG/DL
DEPRECATED RDW RBC AUTO: 47.4 FL (ref 35.1–46.3)
ERYTHROCYTE [DISTWIDTH] IN BLOOD BY AUTOMATED COUNT: 14.8 % (ref 11–15)
EST. AVERAGE GLUCOSE BLD GHB EST-MCNC: 123 MG/DL (ref 68–126)
FASTING PATIENT LIPID ANSWER: YES
FASTING STATUS PATIENT QL REPORTED: YES
GLOBULIN PLAS-MCNC: 3.4 G/DL (ref 2.8–4.4)
GLUCOSE BLD-MCNC: 96 MG/DL (ref 70–99)
HBA1C MFR BLD: 5.9 % (ref ?–5.7)
HCT VFR BLD AUTO: 51 %
HDLC SERPL-MCNC: 68 MG/DL (ref 40–59)
HGB BLD-MCNC: 16.1 G/DL
LDLC SERPL CALC-MCNC: 131 MG/DL (ref ?–100)
MCH RBC QN AUTO: 27.5 PG (ref 26–34)
MCHC RBC AUTO-ENTMCNC: 31.6 G/DL (ref 31–37)
MCV RBC AUTO: 87.2 FL
NONHDLC SERPL-MCNC: 152 MG/DL (ref ?–130)
OSMOLALITY SERPL CALC.SUM OF ELEC: 296 MOSM/KG (ref 275–295)
PLATELET # BLD AUTO: 117 10(3)UL (ref 150–450)
POTASSIUM SERPL-SCNC: 4.4 MMOL/L (ref 3.5–5.1)
PROT SERPL-MCNC: 7.3 G/DL (ref 6.4–8.2)
RBC # BLD AUTO: 5.85 X10(6)UL
SODIUM SERPL-SCNC: 143 MMOL/L (ref 136–145)
TRIGL SERPL-MCNC: 120 MG/DL (ref 30–149)
TSI SER-ACNC: 3.23 MIU/ML (ref 0.36–3.74)
VLDLC SERPL CALC-MCNC: 22 MG/DL (ref 0–30)
WBC # BLD AUTO: 4.4 X10(3) UL (ref 4–11)

## 2022-02-10 PROCEDURE — 85027 COMPLETE CBC AUTOMATED: CPT

## 2022-02-10 PROCEDURE — 80053 COMPREHEN METABOLIC PANEL: CPT

## 2022-02-10 PROCEDURE — 84443 ASSAY THYROID STIM HORMONE: CPT

## 2022-02-10 PROCEDURE — 36415 COLL VENOUS BLD VENIPUNCTURE: CPT

## 2022-02-10 PROCEDURE — 3008F BODY MASS INDEX DOCD: CPT | Performed by: INTERNAL MEDICINE

## 2022-02-10 PROCEDURE — 80061 LIPID PANEL: CPT

## 2022-02-10 PROCEDURE — 3075F SYST BP GE 130 - 139MM HG: CPT | Performed by: INTERNAL MEDICINE

## 2022-02-10 PROCEDURE — 83036 HEMOGLOBIN GLYCOSYLATED A1C: CPT

## 2022-02-10 PROCEDURE — 3079F DIAST BP 80-89 MM HG: CPT | Performed by: INTERNAL MEDICINE

## 2022-02-10 PROCEDURE — 99213 OFFICE O/P EST LOW 20 MIN: CPT | Performed by: INTERNAL MEDICINE

## 2022-04-03 ENCOUNTER — HOSPITAL ENCOUNTER (EMERGENCY)
Facility: HOSPITAL | Age: 77
Discharge: HOME | End: 2022-04-03
Attending: EMERGENCY MEDICINE
Payer: MEDICARE

## 2022-04-03 ENCOUNTER — APPOINTMENT (OUTPATIENT)
Dept: CT IMAGING | Facility: HOSPITAL | Age: 77
End: 2022-04-03
Attending: EMERGENCY MEDICINE
Payer: MEDICARE

## 2022-04-03 VITALS
OXYGEN SATURATION: 96 % | TEMPERATURE: 99 F | BODY MASS INDEX: 27.47 KG/M2 | SYSTOLIC BLOOD PRESSURE: 141 MMHG | WEIGHT: 175 LBS | HEIGHT: 67 IN | HEART RATE: 68 BPM | RESPIRATION RATE: 20 BRPM | DIASTOLIC BLOOD PRESSURE: 92 MMHG

## 2022-04-03 DIAGNOSIS — S39.011A STRAIN OF ABDOMINAL MUSCLE, INITIAL ENCOUNTER: Primary | ICD-10-CM

## 2022-04-03 LAB
ANION GAP SERPL CALC-SCNC: 5 MMOL/L (ref 0–18)
BASOPHILS # BLD AUTO: 0.03 X10(3) UL (ref 0–0.2)
BASOPHILS NFR BLD AUTO: 0.7 %
BILIRUB UR QL: NEGATIVE
BUN BLD-MCNC: 16 MG/DL (ref 7–18)
BUN/CREAT SERPL: 11.4 (ref 10–20)
CALCIUM BLD-MCNC: 8.7 MG/DL (ref 8.5–10.1)
CHLORIDE SERPL-SCNC: 104 MMOL/L (ref 98–112)
CO2 SERPL-SCNC: 31 MMOL/L (ref 21–32)
COLOR UR: YELLOW
CREAT BLD-MCNC: 1.4 MG/DL
DEPRECATED RDW RBC AUTO: 46.4 FL (ref 35.1–46.3)
EOSINOPHIL # BLD AUTO: 0.03 X10(3) UL (ref 0–0.7)
EOSINOPHIL NFR BLD AUTO: 0.7 %
ERYTHROCYTE [DISTWIDTH] IN BLOOD BY AUTOMATED COUNT: 14.6 % (ref 11–15)
GLUCOSE BLD-MCNC: 108 MG/DL (ref 70–99)
GLUCOSE UR-MCNC: 50 MG/DL
HCT VFR BLD AUTO: 52.5 %
HGB BLD-MCNC: 16.4 G/DL
IMM GRANULOCYTES # BLD AUTO: 0.01 X10(3) UL (ref 0–1)
IMM GRANULOCYTES NFR BLD: 0.2 %
KETONES UR-MCNC: NEGATIVE MG/DL
LEUKOCYTE ESTERASE UR QL STRIP.AUTO: NEGATIVE
LYMPHOCYTES # BLD AUTO: 1.65 X10(3) UL (ref 1–4)
LYMPHOCYTES NFR BLD AUTO: 39.1 %
MCH RBC QN AUTO: 26.9 PG (ref 26–34)
MCHC RBC AUTO-ENTMCNC: 31.2 G/DL (ref 31–37)
MCV RBC AUTO: 86.1 FL
MONOCYTES # BLD AUTO: 0.66 X10(3) UL (ref 0.1–1)
MONOCYTES NFR BLD AUTO: 15.6 %
NEUTROPHILS # BLD AUTO: 1.84 X10 (3) UL (ref 1.5–7.7)
NEUTROPHILS # BLD AUTO: 1.84 X10(3) UL (ref 1.5–7.7)
NEUTROPHILS NFR BLD AUTO: 43.7 %
NITRITE UR QL STRIP.AUTO: NEGATIVE
OSMOLALITY SERPL CALC.SUM OF ELEC: 292 MOSM/KG (ref 275–295)
PH UR: 5 [PH] (ref 5–8)
PLATELET # BLD AUTO: 110 10(3)UL (ref 150–450)
POTASSIUM SERPL-SCNC: 3.8 MMOL/L (ref 3.5–5.1)
PROT UR-MCNC: 100 MG/DL
RBC # BLD AUTO: 6.1 X10(6)UL
SODIUM SERPL-SCNC: 140 MMOL/L (ref 136–145)
SP GR UR STRIP: 1.03 (ref 1–1.03)
UROBILINOGEN UR STRIP-ACNC: <2
VIT C UR-MCNC: NEGATIVE MG/DL
WBC # BLD AUTO: 4.2 X10(3) UL (ref 4–11)

## 2022-04-03 PROCEDURE — 80048 BASIC METABOLIC PNL TOTAL CA: CPT | Performed by: EMERGENCY MEDICINE

## 2022-04-03 PROCEDURE — 99284 EMERGENCY DEPT VISIT MOD MDM: CPT

## 2022-04-03 PROCEDURE — 85025 COMPLETE CBC W/AUTO DIFF WBC: CPT | Performed by: EMERGENCY MEDICINE

## 2022-04-03 PROCEDURE — 36415 COLL VENOUS BLD VENIPUNCTURE: CPT

## 2022-04-03 PROCEDURE — 81001 URINALYSIS AUTO W/SCOPE: CPT | Performed by: EMERGENCY MEDICINE

## 2022-04-03 PROCEDURE — 85060 BLOOD SMEAR INTERPRETATION: CPT | Performed by: EMERGENCY MEDICINE

## 2022-04-03 PROCEDURE — 74176 CT ABD & PELVIS W/O CONTRAST: CPT | Performed by: EMERGENCY MEDICINE

## 2022-04-03 NOTE — ED INITIAL ASSESSMENT (HPI)
Pt to the ed for left flank pain for 3 days. Denies nausea, diarrhea, or vomiting. States he has been having intermittent fevers. Denies urinary symptoms.

## 2022-04-03 NOTE — ED QUICK NOTES
Patient safe to DC home per MD. Laboy Her to dress self. DC teaching done, instructions reviewed with patient including when and how to follow up with healthcare providers and when to seek emergency care. The patient verbalizes understanding. Peripheral IV discontinued. Patient ambulatory with steady gait to exit.

## 2022-04-07 ENCOUNTER — OFFICE VISIT (OUTPATIENT)
Dept: INTERNAL MEDICINE CLINIC | Facility: CLINIC | Age: 77
End: 2022-04-07
Payer: MEDICARE

## 2022-04-07 VITALS
HEART RATE: 56 BPM | SYSTOLIC BLOOD PRESSURE: 126 MMHG | BODY MASS INDEX: 30.32 KG/M2 | HEIGHT: 67 IN | DIASTOLIC BLOOD PRESSURE: 74 MMHG | WEIGHT: 193.19 LBS

## 2022-04-07 DIAGNOSIS — K40.90 NON-RECURRENT UNILATERAL INGUINAL HERNIA WITHOUT OBSTRUCTION OR GANGRENE: Primary | ICD-10-CM

## 2022-04-07 DIAGNOSIS — R19.09 INGUINAL BULGE: ICD-10-CM

## 2022-04-07 PROCEDURE — 3008F BODY MASS INDEX DOCD: CPT | Performed by: NURSE PRACTITIONER

## 2022-04-07 PROCEDURE — 99213 OFFICE O/P EST LOW 20 MIN: CPT | Performed by: NURSE PRACTITIONER

## 2022-04-07 PROCEDURE — 3074F SYST BP LT 130 MM HG: CPT | Performed by: NURSE PRACTITIONER

## 2022-04-07 PROCEDURE — 3078F DIAST BP <80 MM HG: CPT | Performed by: NURSE PRACTITIONER

## 2022-04-07 NOTE — ASSESSMENT & PLAN NOTE
Patient with Hx of rightt hernia repair with mesh. Patient now complaining of a left inguinal bulge with no pain or tenderness. He recently went to the ED on 4 3 with complaints of left abdominal pain and left flank pain. Patient denies any pain at office visit but would like a referral for surgeon to evaluate.     Plan  Referral to surgeon

## 2022-04-20 ENCOUNTER — OFFICE VISIT (OUTPATIENT)
Dept: SURGERY | Facility: CLINIC | Age: 77
End: 2022-04-20
Payer: MEDICARE

## 2022-04-20 VITALS — BODY MASS INDEX: 30.29 KG/M2 | HEIGHT: 67 IN | WEIGHT: 193 LBS

## 2022-04-20 DIAGNOSIS — K40.90 NON-RECURRENT UNILATERAL INGUINAL HERNIA WITHOUT OBSTRUCTION OR GANGRENE: Primary | ICD-10-CM

## 2022-04-20 PROCEDURE — 3008F BODY MASS INDEX DOCD: CPT | Performed by: SURGERY

## 2022-04-20 PROCEDURE — 99204 OFFICE O/P NEW MOD 45 MIN: CPT | Performed by: SURGERY

## 2022-05-11 ENCOUNTER — TELEPHONE (OUTPATIENT)
Dept: SURGERY | Facility: CLINIC | Age: 77
End: 2022-05-11

## 2022-05-16 ENCOUNTER — LAB REQUISITION (OUTPATIENT)
Dept: SURGERY | Age: 77
End: 2022-05-16
Payer: MEDICARE

## 2022-05-16 DIAGNOSIS — Z01.818 PREOP EXAMINATION: ICD-10-CM

## 2022-05-16 LAB — SARS-COV-2 RNA RESP QL NAA+PROBE: NOT DETECTED

## 2022-05-19 ENCOUNTER — VIRTUAL CHECK-IN (OUTPATIENT)
Dept: SURGERY | Facility: CLINIC | Age: 77
End: 2022-05-19

## 2022-05-19 DIAGNOSIS — K40.90 NON-RECURRENT UNILATERAL INGUINAL HERNIA WITHOUT OBSTRUCTION OR GANGRENE: Primary | ICD-10-CM

## 2022-05-19 RX ORDER — TRAMADOL HYDROCHLORIDE 50 MG/1
50 TABLET ORAL EVERY 6 HOURS PRN
Qty: 10 TABLET | Refills: 0 | Status: SHIPPED | OUTPATIENT
Start: 2022-05-19

## 2022-05-19 RX ORDER — OXYCODONE HYDROCHLORIDE 5 MG/1
5 TABLET ORAL EVERY 6 HOURS PRN
Qty: 6 TABLET | Refills: 0 | Status: SHIPPED | OUTPATIENT
Start: 2022-05-19

## 2023-04-20 ENCOUNTER — TELEPHONE (OUTPATIENT)
Dept: INTERNAL MEDICINE CLINIC | Facility: CLINIC | Age: 78
End: 2023-04-20

## 2023-05-05 ENCOUNTER — TELEPHONE (OUTPATIENT)
Dept: INTERNAL MEDICINE CLINIC | Facility: CLINIC | Age: 78
End: 2023-05-05

## 2023-06-20 ENCOUNTER — TELEPHONE (OUTPATIENT)
Dept: INTERNAL MEDICINE CLINIC | Facility: CLINIC | Age: 78
End: 2023-06-20

## 2023-12-04 ENCOUNTER — TELEPHONE (OUTPATIENT)
Dept: INTERNAL MEDICINE CLINIC | Facility: CLINIC | Age: 78
End: 2023-12-04

## 2023-12-04 NOTE — TELEPHONE ENCOUNTER
LVM as Kayla Wynne is over due for his MA [employer]PER [address] last 1/20/22 - please schedule for any day and any time as he is over due.

## 2023-12-05 ENCOUNTER — OFFICE VISIT (OUTPATIENT)
Facility: CLINIC | Age: 78
End: 2023-12-05

## 2023-12-05 ENCOUNTER — LAB ENCOUNTER (OUTPATIENT)
Dept: LAB | Facility: HOSPITAL | Age: 78
End: 2023-12-05
Attending: INTERNAL MEDICINE
Payer: MEDICARE

## 2023-12-05 VITALS
SYSTOLIC BLOOD PRESSURE: 130 MMHG | RESPIRATION RATE: 14 BRPM | DIASTOLIC BLOOD PRESSURE: 80 MMHG | WEIGHT: 185 LBS | HEIGHT: 67 IN | BODY MASS INDEX: 29.03 KG/M2 | OXYGEN SATURATION: 98 % | HEART RATE: 72 BPM

## 2023-12-05 DIAGNOSIS — R73.03 PREDIABETES: ICD-10-CM

## 2023-12-05 DIAGNOSIS — E78.2 MIXED HYPERLIPIDEMIA: ICD-10-CM

## 2023-12-05 DIAGNOSIS — E78.2 MIXED HYPERLIPIDEMIA: Primary | ICD-10-CM

## 2023-12-05 DIAGNOSIS — Z12.5 SCREENING PSA (PROSTATE SPECIFIC ANTIGEN): ICD-10-CM

## 2023-12-05 DIAGNOSIS — N18.31 STAGE 3A CHRONIC KIDNEY DISEASE (HCC): Chronic | ICD-10-CM

## 2023-12-05 DIAGNOSIS — I70.0 ATHEROSCLEROSIS OF AORTA (HCC): ICD-10-CM

## 2023-12-05 DIAGNOSIS — D69.6 THROMBOCYTOPENIA (HCC): ICD-10-CM

## 2023-12-05 DIAGNOSIS — I10 ESSENTIAL HYPERTENSION: ICD-10-CM

## 2023-12-05 DIAGNOSIS — M54.41 CHRONIC RIGHT-SIDED LOW BACK PAIN WITH RIGHT-SIDED SCIATICA: ICD-10-CM

## 2023-12-05 DIAGNOSIS — N28.1 KIDNEY CYST, ACQUIRED: ICD-10-CM

## 2023-12-05 DIAGNOSIS — G89.29 CHRONIC RIGHT-SIDED LOW BACK PAIN WITH RIGHT-SIDED SCIATICA: ICD-10-CM

## 2023-12-05 PROBLEM — N18.30 CKD (CHRONIC KIDNEY DISEASE) STAGE 3, GFR 30-59 ML/MIN (HCC): Chronic | Status: ACTIVE | Noted: 2023-12-05

## 2023-12-05 PROBLEM — R19.09 INGUINAL BULGE: Status: RESOLVED | Noted: 2022-04-07 | Resolved: 2023-12-05

## 2023-12-05 LAB
ALBUMIN SERPL-MCNC: 4.5 G/DL (ref 3.2–4.8)
ALBUMIN/GLOB SERPL: 1.5 {RATIO} (ref 1–2)
ALP LIVER SERPL-CCNC: 69 U/L
ALT SERPL-CCNC: 23 U/L
ANION GAP SERPL CALC-SCNC: 4 MMOL/L (ref 0–18)
AST SERPL-CCNC: 30 U/L (ref ?–34)
BILIRUB SERPL-MCNC: 0.8 MG/DL (ref 0.2–1.1)
BUN BLD-MCNC: 11 MG/DL (ref 9–23)
BUN/CREAT SERPL: 9 (ref 10–20)
CALCIUM BLD-MCNC: 9.5 MG/DL (ref 8.7–10.4)
CHLORIDE SERPL-SCNC: 106 MMOL/L (ref 98–112)
CHOLEST SERPL-MCNC: 201 MG/DL (ref ?–200)
CO2 SERPL-SCNC: 31 MMOL/L (ref 21–32)
COMPLEXED PSA SERPL-MCNC: 0.88 NG/ML (ref ?–4)
CREAT BLD-MCNC: 1.22 MG/DL
DEPRECATED RDW RBC AUTO: 44.9 FL (ref 35.1–46.3)
EGFRCR SERPLBLD CKD-EPI 2021: 60 ML/MIN/1.73M2 (ref 60–?)
ERYTHROCYTE [DISTWIDTH] IN BLOOD BY AUTOMATED COUNT: 14.9 % (ref 11–15)
EST. AVERAGE GLUCOSE BLD GHB EST-MCNC: 123 MG/DL (ref 68–126)
FASTING PATIENT LIPID ANSWER: YES
FASTING STATUS PATIENT QL REPORTED: YES
GLOBULIN PLAS-MCNC: 3 G/DL (ref 2.8–4.4)
GLUCOSE BLD-MCNC: 96 MG/DL (ref 70–99)
HBA1C MFR BLD: 5.9 % (ref ?–5.7)
HCT VFR BLD AUTO: 50.8 %
HDLC SERPL-MCNC: 60 MG/DL (ref 40–59)
HGB BLD-MCNC: 16.2 G/DL
LDLC SERPL CALC-MCNC: 119 MG/DL (ref ?–100)
MCH RBC QN AUTO: 26.8 PG (ref 26–34)
MCHC RBC AUTO-ENTMCNC: 31.9 G/DL (ref 31–37)
MCV RBC AUTO: 84.1 FL
NONHDLC SERPL-MCNC: 141 MG/DL (ref ?–130)
OSMOLALITY SERPL CALC.SUM OF ELEC: 291 MOSM/KG (ref 275–295)
PLATELET # BLD AUTO: 115 10(3)UL (ref 150–450)
POTASSIUM SERPL-SCNC: 4.3 MMOL/L (ref 3.5–5.1)
PROT SERPL-MCNC: 7.5 G/DL (ref 5.7–8.2)
RBC # BLD AUTO: 6.04 X10(6)UL
SODIUM SERPL-SCNC: 141 MMOL/L (ref 136–145)
T4 FREE SERPL-MCNC: 0.9 NG/DL (ref 0.8–1.7)
TRIGL SERPL-MCNC: 122 MG/DL (ref 30–149)
TSI SER-ACNC: 5.37 MIU/ML (ref 0.55–4.78)
VLDLC SERPL CALC-MCNC: 21 MG/DL (ref 0–30)
WBC # BLD AUTO: 4.9 X10(3) UL (ref 4–11)

## 2023-12-05 PROCEDURE — 84443 ASSAY THYROID STIM HORMONE: CPT

## 2023-12-05 PROCEDURE — 83036 HEMOGLOBIN GLYCOSYLATED A1C: CPT

## 2023-12-05 PROCEDURE — 80061 LIPID PANEL: CPT

## 2023-12-05 PROCEDURE — 36415 COLL VENOUS BLD VENIPUNCTURE: CPT

## 2023-12-05 PROCEDURE — 80053 COMPREHEN METABOLIC PANEL: CPT

## 2023-12-05 PROCEDURE — 85060 BLOOD SMEAR INTERPRETATION: CPT

## 2023-12-05 PROCEDURE — 84439 ASSAY OF FREE THYROXINE: CPT

## 2023-12-05 PROCEDURE — 85027 COMPLETE CBC AUTOMATED: CPT

## 2024-04-02 ENCOUNTER — HOSPITAL ENCOUNTER (EMERGENCY)
Facility: HOSPITAL | Age: 79
Discharge: HOME | End: 2024-04-02
Attending: EMERGENCY MEDICINE
Payer: MEDICARE

## 2024-04-02 VITALS
RESPIRATION RATE: 18 BRPM | DIASTOLIC BLOOD PRESSURE: 82 MMHG | HEART RATE: 51 BPM | OXYGEN SATURATION: 95 % | TEMPERATURE: 98 F | SYSTOLIC BLOOD PRESSURE: 140 MMHG

## 2024-04-02 DIAGNOSIS — R25.3 MUSCLE TWITCHING: ICD-10-CM

## 2024-04-02 DIAGNOSIS — R51.9 LEFT-SIDED HEADACHE: Primary | ICD-10-CM

## 2024-04-02 LAB
ANION GAP SERPL CALC-SCNC: 5 MMOL/L (ref 0–18)
BASOPHILS # BLD AUTO: 0.02 X10(3) UL (ref 0–0.2)
BASOPHILS NFR BLD AUTO: 0.5 %
BUN BLD-MCNC: 12 MG/DL (ref 9–23)
BUN/CREAT SERPL: 10.1 (ref 10–20)
CALCIUM BLD-MCNC: 9 MG/DL (ref 8.7–10.4)
CHLORIDE SERPL-SCNC: 109 MMOL/L (ref 98–112)
CO2 SERPL-SCNC: 25 MMOL/L (ref 21–32)
CREAT BLD-MCNC: 1.19 MG/DL
DEPRECATED RDW RBC AUTO: 44.1 FL (ref 35.1–46.3)
EGFRCR SERPLBLD CKD-EPI 2021: 61 ML/MIN/1.73M2 (ref 60–?)
EOSINOPHIL # BLD AUTO: 0.02 X10(3) UL (ref 0–0.7)
EOSINOPHIL NFR BLD AUTO: 0.5 %
ERYTHROCYTE [DISTWIDTH] IN BLOOD BY AUTOMATED COUNT: 14.8 % (ref 11–15)
ERYTHROCYTE [SEDIMENTATION RATE] IN BLOOD: 32 MM/HR
GLUCOSE BLD-MCNC: 102 MG/DL (ref 70–99)
HCT VFR BLD AUTO: 46.8 %
HGB BLD-MCNC: 15.4 G/DL
IMM GRANULOCYTES # BLD AUTO: 0.01 X10(3) UL (ref 0–1)
IMM GRANULOCYTES NFR BLD: 0.3 %
LYMPHOCYTES # BLD AUTO: 1.58 X10(3) UL (ref 1–4)
LYMPHOCYTES NFR BLD AUTO: 43.1 %
MCH RBC QN AUTO: 27 PG (ref 26–34)
MCHC RBC AUTO-ENTMCNC: 32.9 G/DL (ref 31–37)
MCV RBC AUTO: 82 FL
MONOCYTES # BLD AUTO: 0.53 X10(3) UL (ref 0.1–1)
MONOCYTES NFR BLD AUTO: 14.4 %
NEUTROPHILS # BLD AUTO: 1.51 X10 (3) UL (ref 1.5–7.7)
NEUTROPHILS # BLD AUTO: 1.51 X10(3) UL (ref 1.5–7.7)
NEUTROPHILS NFR BLD AUTO: 41.2 %
OSMOLALITY SERPL CALC.SUM OF ELEC: 288 MOSM/KG (ref 275–295)
PLATELET # BLD AUTO: 111 10(3)UL (ref 150–450)
PLATELETS.RETICULATED NFR BLD AUTO: 4.6 % (ref 0–7)
POTASSIUM SERPL-SCNC: 4 MMOL/L (ref 3.5–5.1)
RBC # BLD AUTO: 5.71 X10(6)UL
SODIUM SERPL-SCNC: 139 MMOL/L (ref 136–145)
WBC # BLD AUTO: 3.7 X10(3) UL (ref 4–11)

## 2024-04-02 PROCEDURE — 80048 BASIC METABOLIC PNL TOTAL CA: CPT | Performed by: EMERGENCY MEDICINE

## 2024-04-02 PROCEDURE — 99283 EMERGENCY DEPT VISIT LOW MDM: CPT

## 2024-04-02 PROCEDURE — 99284 EMERGENCY DEPT VISIT MOD MDM: CPT

## 2024-04-02 PROCEDURE — 36415 COLL VENOUS BLD VENIPUNCTURE: CPT

## 2024-04-02 PROCEDURE — 85652 RBC SED RATE AUTOMATED: CPT | Performed by: EMERGENCY MEDICINE

## 2024-04-02 PROCEDURE — 85025 COMPLETE CBC W/AUTO DIFF WBC: CPT | Performed by: EMERGENCY MEDICINE

## 2024-04-02 NOTE — ED PROVIDER NOTES
Patient Seen in: [facility] Emergency Department    History     Chief Complaint   Patient presents with    Headache       HPI    79-year-old male with a history of hypertension, CKD who presents to the emergency department with 6 hours of left-sided parietal muscle tensing sensation he states that last approximately 5 seconds at a time occurring every 20 minutes.  He denies any pain and declines to call it a headache.  He denies any nausea or emesis or vision changes or diplopia or any focal weakness or numbness or paresthesias.        History reviewed.   Past Medical History:   Diagnosis Date    Bunion 2006    Hyperlipidemia     Hypertension     Kidney cysts     Low back pain     Pulmonary nodules     Sciatica     Thrombocytopenia (HCC)        History reviewed.   Past Surgical History:   Procedure Laterality Date    COLONOSCOPY  2014    DENTAL SURGERY PROCEDURE  2015    extraction; no associated bleeding complications    INGUINAL HERNIA REPAIR Right 11/17/2017         Medications :  (Not in a hospital admission)       Family History   Problem Relation Age of Onset    Lipids Other     Other (Vascular disease) Other     Cancer Paternal Uncle         uncertain etiology    Stroke Paternal Uncle     Bleeding Disorders Neg     Clotting Disorder Neg        Smoking Status:   Social History     Socioeconomic History    Marital status: Single    Number of children: 5   Occupational History    Occupation: production     Employer: [employer]     Comment: retired   Tobacco Use    Smoking status: Never    Smokeless tobacco: Never   Vaping Use    Vaping Use: Never used   Substance and Sexual Activity    Alcohol use: Not Currently     Comment: fomer heavy alcohol use; now no longer drinks    Drug use: No    Sexual activity: Yes   Other Topics Concern    Caffeine Concern Yes       Constitutional and vital signs reviewed.      Social History and Family History elements reviewed from today, pertinent positives to the  presenting problem noted.    Physical Exam     ED Triage Vitals [04/02/24 1137]   BP (!) 183/79   Pulse 59   Resp 18   Temp 98 °F (36.7 °C)   Temp src Temporal   SpO2 99 %   O2 Device None (Room air)       All measures to prevent infection transmission during my interaction with the patient were taken. The patient was already wearing a droplet mask on my arrival to the room. Personal protective equipment was worn throughout the duration of the exam.  Handwashing was performed prior to and after the exam.  Stethoscope and any equipment used during my examination was cleaned with super sani-cloth germicidal wipes following the exam.     Physical Exam    General: NAD  Head: Normocephalic and atraumatic.  No temporal tenderness bilaterally.  Mouth/Throat/Ears/Nose: Oropharynx is clear and moist.   Eyes: Conjunctivae and EOM are normal.   Neck: Normal range of motion. Supple.   Cardiovascular: Normal rate, regular rhythm, normal heart sounds.  Respiratory/Chest: Clear and equal bilaterally. Exhibits no tenderness.  Gastrointestinal: Soft, non-tender, non-distended. Bowel sounds are normal.   Musculoskeletal:No swelling or deformity.   Neurological: Alert and appropriate. No focal deficits. AOx4  CN II-XII grossly intact  5/5 strength: Left  strength, deltoid abduction, achilles, patella  5/5 strength: Right  strength, deltoid abduction, achilles, patella   SILT to bilateral upper and lower extremities   normal gait . Normal FTN, HTS     Skin: Skin is warm and dry. No pallor.  Psychiatric: Has a normal mood and affect.      ED Course        Labs Reviewed   BASIC METABOLIC PANEL (8) - Abnormal; Notable for the following components:       Result Value    Glucose 102 (*)     All other components within normal limits   SED RATE, WESTERGREN (AUTOMATED) - Abnormal; Notable for the following components:    Sed Rate 32 (*)     All other components within normal limits   CBC W/ DIFFERENTIAL - Abnormal; Notable for the  following components:    WBC 3.7 (*)     .0 (*)     All other components within normal limits   CBC WITH DIFFERENTIAL WITH PLATELET    Narrative:     The following orders were created for panel order CBC With Differential With Platelet.                  Procedure                               Abnormality         Status                                     ---------                               -----------         ------                                     CBC W/ DIFFERENTIAL[[id number]]          Abnormal            Final result                                                 Please view results for these tests on the individual orders.   RAINBOW DRAW LAVENDER   RAINBOW DRAW LIGHT GREEN       As Interpreted by me    Imaging Results Available and Reviewed while in ED: No results found.  ED Medications Administered: Medications - No data to display      MDM     Vitals:    04/02/24 1137 04/02/24 1230   BP: (!) 183/79 140/82   Pulse: 59 51   Resp: 18 18   Temp: 98 °F (36.7 °C)    TempSrc: Temporal    SpO2: 99% 95%     *I personally reviewed and interpreted all ED vitals.    Pulse Ox: 95%, Room air, Normal       Medical Decision Making      Differential Diagnosis/ Diagnostic Considerations: Significant muscle twitch, temporal arteritis    Complicating Factors: The patient already has does not have any pertinent problems on file. to contribute to the complexity of this ED evaluation.    I reviewed prior chart records including office visit note from December 5, 2023.  Patient is here without red flags or neurologic deficits to suggest meningitis or intracranial hemorrhage or subarachnoid hemorrhage.  Patient declines any pain medication, denying any pain.  Temporal arteritis is less likely, labs ordered    Labs reviewed and unremarkable for acute findings on my interpretation.    Discharged in stable condition.  Patient is comfortable with the plan and understands to follow-up closely with PCP this  week.      Disposition and Plan     Clinical Impression:  1. Left-sided headache    2. Muscle twitching        Disposition:  Discharge    Follow-up:  Anjel Márquez MD  [address] [place] [address] [id number] [phone]    Schedule an appointment as soon as possible for a visit in 1 day(s)        Medications Prescribed:  There are no discharge medications for this patient.

## 2024-04-02 NOTE — ED INITIAL ASSESSMENT (HPI)
Pt to ED via triage c/o pain to left temporal area since last night. States the pain is intermittent. Denies any blurry vision or dizziness. -BEFAST in triage. Denies any major med hx.

## 2024-04-08 ENCOUNTER — PATIENT OUTREACH (OUTPATIENT)
Dept: CASE MANAGEMENT | Age: 79
End: 2024-04-08

## 2024-04-08 NOTE — PROGRESS NOTES
1st attempt ER f/up apt request  No answer, LVMTCB to schedule apts  PCP -existing apt (6/6) for MA supervisit, unable to contact  Closing encounter

## 2024-06-02 ENCOUNTER — APPOINTMENT (OUTPATIENT)
Dept: GENERAL RADIOLOGY | Facility: HOSPITAL | Age: 79
End: 2024-06-02
Attending: EMERGENCY MEDICINE
Payer: MEDICARE

## 2024-06-02 ENCOUNTER — HOSPITAL ENCOUNTER (EMERGENCY)
Facility: HOSPITAL | Age: 79
Discharge: LEFT BEFORE DISCHARGE | End: 2024-06-02
Attending: EMERGENCY MEDICINE
Payer: MEDICARE

## 2024-06-02 VITALS
OXYGEN SATURATION: 99 % | RESPIRATION RATE: 17 BRPM | HEART RATE: 54 BPM | HEIGHT: 67 IN | WEIGHT: 185 LBS | TEMPERATURE: 98 F | SYSTOLIC BLOOD PRESSURE: 114 MMHG | BODY MASS INDEX: 29.03 KG/M2 | DIASTOLIC BLOOD PRESSURE: 101 MMHG

## 2024-06-02 DIAGNOSIS — I24.9 ACUTE CORONARY SYNDROME (HCC): Primary | ICD-10-CM

## 2024-06-02 LAB
ALBUMIN SERPL-MCNC: 4.7 G/DL (ref 3.2–4.8)
ALP LIVER SERPL-CCNC: 69 U/L
ALT SERPL-CCNC: 20 U/L
ANION GAP SERPL CALC-SCNC: 7 MMOL/L (ref 0–18)
AST SERPL-CCNC: 28 U/L (ref ?–34)
ATRIAL RATE: 62 BPM
BASOPHILS # BLD AUTO: 0.02 X10(3) UL (ref 0–0.2)
BASOPHILS NFR BLD AUTO: 0.3 %
BILIRUB DIRECT SERPL-MCNC: 0.3 MG/DL (ref ?–0.3)
BILIRUB SERPL-MCNC: 1 MG/DL (ref 0.2–1.1)
BNP SERPL-MCNC: 51 PG/ML
BUN BLD-MCNC: 16 MG/DL (ref 9–23)
BUN/CREAT SERPL: 13.2 (ref 10–20)
CALCIUM BLD-MCNC: 9.3 MG/DL (ref 8.7–10.4)
CHLORIDE SERPL-SCNC: 109 MMOL/L (ref 98–112)
CO2 SERPL-SCNC: 26 MMOL/L (ref 21–32)
CREAT BLD-MCNC: 1.21 MG/DL
D DIMER PPP FEU-MCNC: 0.79 UG/ML FEU (ref ?–0.81)
DEPRECATED RDW RBC AUTO: 45.2 FL (ref 35.1–46.3)
EGFRCR SERPLBLD CKD-EPI 2021: 60 ML/MIN/1.73M2 (ref 60–?)
EOSINOPHIL # BLD AUTO: 0.04 X10(3) UL (ref 0–0.7)
EOSINOPHIL NFR BLD AUTO: 0.7 %
ERYTHROCYTE [DISTWIDTH] IN BLOOD BY AUTOMATED COUNT: 15 % (ref 11–15)
GLUCOSE BLD-MCNC: 105 MG/DL (ref 70–99)
HCT VFR BLD AUTO: 48.4 %
HGB BLD-MCNC: 16.2 G/DL
IMM GRANULOCYTES # BLD AUTO: 0.01 X10(3) UL (ref 0–1)
IMM GRANULOCYTES NFR BLD: 0.2 %
LYMPHOCYTES # BLD AUTO: 1.58 X10(3) UL (ref 1–4)
LYMPHOCYTES NFR BLD AUTO: 27.5 %
MCH RBC QN AUTO: 27.7 PG (ref 26–34)
MCHC RBC AUTO-ENTMCNC: 33.5 G/DL (ref 31–37)
MCV RBC AUTO: 82.9 FL
MONOCYTES # BLD AUTO: 0.8 X10(3) UL (ref 0.1–1)
MONOCYTES NFR BLD AUTO: 13.9 %
NEUTROPHILS # BLD AUTO: 3.29 X10 (3) UL (ref 1.5–7.7)
NEUTROPHILS # BLD AUTO: 3.29 X10(3) UL (ref 1.5–7.7)
NEUTROPHILS NFR BLD AUTO: 57.4 %
OSMOLALITY SERPL CALC.SUM OF ELEC: 296 MOSM/KG (ref 275–295)
P AXIS: 23 DEGREES
P-R INTERVAL: 204 MS
PLATELET # BLD AUTO: 106 10(3)UL (ref 150–450)
PLATELETS.RETICULATED NFR BLD AUTO: 3.3 % (ref 0–7)
POTASSIUM SERPL-SCNC: 4.1 MMOL/L (ref 3.5–5.1)
PROT SERPL-MCNC: 7.4 G/DL (ref 5.7–8.2)
Q-T INTERVAL: 436 MS
QRS DURATION: 106 MS
QTC CALCULATION (BEZET): 442 MS
R AXIS: -31 DEGREES
RBC # BLD AUTO: 5.84 X10(6)UL
SODIUM SERPL-SCNC: 142 MMOL/L (ref 136–145)
T AXIS: 143 DEGREES
TROPONIN I SERPL HS-MCNC: 5 NG/L
TROPONIN I SERPL HS-MCNC: 5 NG/L
VENTRICULAR RATE: 62 BPM
WBC # BLD AUTO: 5.7 X10(3) UL (ref 4–11)

## 2024-06-02 PROCEDURE — 99223 1ST HOSP IP/OBS HIGH 75: CPT | Performed by: INTERNAL MEDICINE

## 2024-06-02 PROCEDURE — 71045 X-RAY EXAM CHEST 1 VIEW: CPT | Performed by: EMERGENCY MEDICINE

## 2024-06-02 RX ORDER — NITROGLYCERIN 0.4 MG/1
0.4 TABLET SUBLINGUAL EVERY 5 MIN PRN
OUTPATIENT
Start: 2024-06-02

## 2024-06-02 RX ORDER — NITROGLYCERIN 0.4 MG/1
0.4 TABLET SUBLINGUAL ONCE
Status: COMPLETED | OUTPATIENT
Start: 2024-06-02 | End: 2024-06-02

## 2024-06-02 RX ORDER — ONDANSETRON 2 MG/ML
4 INJECTION INTRAMUSCULAR; INTRAVENOUS EVERY 6 HOURS PRN
OUTPATIENT
Start: 2024-06-02

## 2024-06-02 RX ORDER — ASPIRIN 325 MG
325 TABLET ORAL DAILY
OUTPATIENT
Start: 2024-06-02

## 2024-06-02 RX ORDER — METOCLOPRAMIDE HYDROCHLORIDE 5 MG/ML
5 INJECTION INTRAMUSCULAR; INTRAVENOUS EVERY 8 HOURS PRN
OUTPATIENT
Start: 2024-06-02

## 2024-06-02 RX ORDER — LOSARTAN POTASSIUM 50 MG/1
50 TABLET ORAL DAILY
Status: DISCONTINUED | OUTPATIENT
Start: 2024-06-02 | End: 2024-06-02

## 2024-06-02 RX ORDER — SODIUM CHLORIDE 9 MG/ML
INJECTION, SOLUTION INTRAVENOUS CONTINUOUS
OUTPATIENT
Start: 2024-06-02

## 2024-06-02 RX ORDER — POLYETHYLENE GLYCOL 3350 17 G/17G
17 POWDER, FOR SOLUTION ORAL DAILY PRN
OUTPATIENT
Start: 2024-06-02

## 2024-06-02 RX ORDER — ENEMA 19; 7 G/133ML; G/133ML
1 ENEMA RECTAL ONCE AS NEEDED
OUTPATIENT
Start: 2024-06-02

## 2024-06-02 RX ORDER — SENNOSIDES 8.6 MG
17.2 TABLET ORAL NIGHTLY PRN
OUTPATIENT
Start: 2024-06-02

## 2024-06-02 RX ORDER — MELATONIN
3 NIGHTLY PRN
OUTPATIENT
Start: 2024-06-02

## 2024-06-02 RX ORDER — BISACODYL 10 MG
10 SUPPOSITORY, RECTAL RECTAL
OUTPATIENT
Start: 2024-06-02

## 2024-06-02 RX ORDER — BENZONATATE 200 MG/1
200 CAPSULE ORAL 3 TIMES DAILY PRN
OUTPATIENT
Start: 2024-06-02

## 2024-06-02 RX ORDER — FAMOTIDINE 10 MG/ML
20 INJECTION, SOLUTION INTRAVENOUS ONCE
Status: COMPLETED | OUTPATIENT
Start: 2024-06-02 | End: 2024-06-02

## 2024-06-02 RX ORDER — ACETAMINOPHEN 500 MG
500 TABLET ORAL EVERY 4 HOURS PRN
OUTPATIENT
Start: 2024-06-02

## 2024-06-02 RX ORDER — HEPARIN SODIUM 5000 [USP'U]/ML
5000 INJECTION, SOLUTION INTRAVENOUS; SUBCUTANEOUS EVERY 8 HOURS SCHEDULED
OUTPATIENT
Start: 2024-06-02

## 2024-06-02 RX ORDER — ASPIRIN 81 MG/1
324 TABLET, CHEWABLE ORAL ONCE
Status: COMPLETED | OUTPATIENT
Start: 2024-06-02 | End: 2024-06-02

## 2024-06-02 NOTE — ED QUICK NOTES
Orders for admission, patient is aware of plan and ready to go upstairs. Any questions, please call ED RN Missy at extension [phone].     Patient Covid vaccination status: Fully vaccinated     COVID Test Ordered in ED: None    COVID Suspicion at Admission: N/A    Running Infusions:  None    Mental Status/LOC at time of transport: A/Ox4    Other pertinent information: Independent and ambulatory from home  CIWA score: N/A   NIH score:  N/A

## 2024-06-02 NOTE — ED QUICK NOTES
Patient stated he wants to leave and will walk out. Patient refusing to be admitted. Per MD complete 2hr troponin and hold losartan. Shweta reports he is aware of HTN and doesn't take the \"red pill\" for it.

## 2024-06-02 NOTE — CONSULTS
[facility]  Cardiology Consultation    Pepito Mendoza Patient Status:  Emergency    1945 MRN [id number]   Location [facility] EMERGENCY DEPARTMENT Attending Neville Mensah MD   Hosp Day # 0 PCP Anjel Márquez MD     Date of Admission:  2024  Date of Consult:  2024  Reason for Consultation:   Chest pain    History of Present Illness:   Patient is an 79-year-old male who presents with chest discomfort.  Yesterday he was in the gym lifting heavy weights, afterwards he developed some vague soreness of his left lower chest.  The discomfort was persistent and worsened if he does any activity with his arms.  No association with exertion, position or palpation.  He denies any shortness of breath.  No palpitations, edema, dizziness, or syncope.    D-dimer normal  Troponin 5  BNP 51    CXR-no acute findings  ECG-sinus rhythm, LVH, ST-T wave abnormality probably strain pattern, 62 bpm    Cardiac history:  HTN  HLD    Past Medical History  Past Medical History:    Bunion    Hyperlipidemia    Hypertension    Kidney cysts    Low back pain    Pulmonary nodules    Sciatica    Thrombocytopenia (HCC)     Past Surgical History  Past Surgical History:   Procedure Laterality Date    Colonoscopy      Dental surgery procedure  2015    extraction; no associated bleeding complications    Inguinal hernia repair Right 2017     Family History  Family History   Problem Relation Age of Onset    Lipids Other     Other (Vascular disease) Other     Cancer Paternal Uncle         uncertain etiology    Stroke Paternal Uncle     Bleeding Disorders Neg     Clotting Disorder Neg      Social History  Pediatric History   Patient Parents    Not on file     Other Topics Concern     Service Not Asked    Blood Transfusions Not Asked    Caffeine Concern Yes    Occupational Exposure Not Asked    Hobby Hazards Not Asked    Sleep Concern Not Asked    Stress Concern Not Asked    Weight Concern Not  Asked    Special Diet Not Asked    Back Care Not Asked    Exercise Not Asked    Bike Helmet Not Asked    Seat Belt Not Asked    Self-Exams Not Asked   Social History Narrative    Pepito is  x 10 yrs. Retired from working to work at [employer]. He lives alone in [place], [place]. Father of 5 adult children.         Current Medications:  No current facility-administered medications for this encounter.     (Not in a hospital admission)    Allergies  No Known Allergies    Review of Systems:     14 point review of systems completed and negative except as noted.    Physical Exam:   Patient Vitals for the past 24 hrs:   BP Temp Temp src Pulse Resp SpO2 Height Weight   06/02/24 0945 141/89 -- -- 64 19 95 % -- --   06/02/24 0930 (!) 159/91 -- -- 62 15 95 % -- --   06/02/24 0904 (!) 174/76 97.8 °F (36.6 °C) Oral 66 18 97 % 5' 7\" (1.702 m) 185 lb (83.9 kg)     General: Alert and oriented x 3. No apparent distress.   HEENT: Normocephalic, anicteric sclera, neck supple, no thyromegaly or adenopathy.  Neck: No JVD, carotids 2+, no bruits.  Cardiac: Regular rate and rhythm. S1, S2 normal. No murmur, pericardial rub, S3, or extra cardiac sounds.  Lungs: Clear without wheezes, rales, rhonchi or dullness.  Normal excursions and effort.  Abdomen: Soft, non-tender. No organosplenomegally, mass or rebound, BS-present.  Extremities: Without clubbing or cyanosis. No edema.    Neurologic: Alert and oriented, normal affect. No focal defects  Skin: Warm and dry.     Results:   Laboratory Data:  Lab Results   Component Value Date    WBC 5.7 06/02/2024    HGB 16.2 06/02/2024    HCT 48.4 06/02/2024    .0 (L) 06/02/2024    CREATSERUM 1.21 06/02/2024    BUN 16 06/02/2024     06/02/2024    K 4.1 06/02/2024     06/02/2024    CO2 26.0 06/02/2024     (H) 06/02/2024    CA 9.3 06/02/2024    ALB 4.7 06/02/2024    ALKPHO 69 06/02/2024    TP 7.4 06/02/2024    AST 28 06/02/2024    ALT 20 06/02/2024    T4F 0.9  12/05/2023    TSH 5.370 (H) 12/05/2023    PSA 0.8 10/29/2018    DDIMER 0.79 06/02/2024    ESRML 32 (H) 04/02/2024    B12 415 11/14/2017         Recent Labs   Lab 06/02/24  0934   *   BUN 16   CREATSERUM 1.21   CA 9.3      K 4.1      CO2 26.0     Recent Labs   Lab 06/02/24  0934   RBC 5.84*   HGB 16.2   HCT 48.4   MCV 82.9   MCH 27.7   MCHC 33.5   RDW 15.0   NEPRELIM 3.29   WBC 5.7   .0*       Recent Labs   Lab 06/02/24  0934   BNPML 51       No results for input(s): \"TROP\", \"CK\" in the last 168 hours.    Imaging:  XR CHEST AP PORTABLE  (CPT=71045)    Result Date: 6/2/2024  CONCLUSION:   No new focal opacity, pleural effusion, or pneumothorax.  No significant change in the minimal left basilar atelectasis/scarring.  Redemonstrated enlarged cardiomediastinal silhouette.    Dictated by (CST): George Valle MD on 6/02/2024 at 10:08 AM     Finalized by (CST): George Valle MD on 6/02/2024 at 10:10 AM           Impression:   Chest pain  - By description most consistent with musculoskeletal discomfort as symptoms began after he was lifting heavy weights, worsened with arm movement, nonexertional  - Reviewed twelve-lead ECG with evidence of LVH and ST-T wave normality, does have cardiac risk factors and may benefit from ischemic evaluation  - Troponins were drawn and pending, continue to trend  - If troponin negative would favor nuclear stress test tomorrow  - Transthoracic echocardiogram    HTN  - Evidence of significant LVH and ST-T wave normality on ECG, elevated here today  - Start losartan 50 mg daily given concurrent diabetes, mild CKD  - Transthoracic echocardiogram as above  - Likely nuclear stress test tomorrow    Thank you for allowing me to participate in the care of your patient.    Masoud Ni MD  [facility]  6/2/2024

## 2024-06-02 NOTE — ED QUICK NOTES
Risks/benefits/alternatives discussed with patient as applicable to reason for leaving AMA? Yes  Provider(s) contacted: MD Rodrick  Patient education/AVS/follow-up appointments/prescriptions given? no  AMA paperwork completed? yes  Removed IV access/decannulated port if applicable and patient belongings returned.    Documented from Admission Navigator: n/a   Informed patient I would speak to ED physician and while doing so patient ripped out IV and left AMA before signing documentation.

## 2024-06-02 NOTE — H&P
[facility]  part of [facility]    History and Physical     Pepito Mendoza Patient Status:  Emergency    1945 MRN [id number]   Location [facility] EMERGENCY DEPARTMENT Attending Neville Mensah MD   Hosp Day # 0 PCP Anjel Márquez MD       History of Present Illness: Pepito Mendoza is a 79 year old male with a past medical history of HTN, HLD, presented to the ER with complaints of chest pain that began last night while he was lifting weights. The pain worsened with activity and improved slightly with rest.   The patient denied any similar episodes in the past.   He denied any shortness of breath, n/v/d or any other complaints.     Past Medical History:  Past Medical History:    Bunion    Hyperlipidemia    Hypertension    Kidney cysts    Low back pain    Pulmonary nodules    Sciatica    Thrombocytopenia (HCC)        Past Surgical History:   Past Surgical History:   Procedure Laterality Date    Colonoscopy      Dental surgery procedure  2015    extraction; no associated bleeding complications    Inguinal hernia repair Right 2017       Social History:  reports that he has never smoked. He has never used smokeless tobacco. He reports that he does not currently use alcohol. He reports that he does not use drugs.    Family History:   Family History   Problem Relation Age of Onset    Lipids Other     Other (Vascular disease) Other     Cancer Paternal Uncle         uncertain etiology    Stroke Paternal Uncle     Bleeding Disorders Neg     Clotting Disorder Neg        Allergies: No Known Allergies    Medications:    No current facility-administered medications on file prior to encounter.     No current outpatient medications on file prior to encounter.       Review of Systems:   A comprehensive 14 point review of systems was completed.    Pertinent positives and negatives noted in the HPI.    Physical Exam:    /89   Pulse 64   Temp 97.8 °F (36.6 °C) (Oral)   Resp 19    Ht 5' 7\" (1.702 m)   Wt 185 lb (83.9 kg)   SpO2 95%   BMI 28.98 kg/m²   General: No acute distress. Alert and oriented x 3.  Respiratory: Clear to auscultation bilaterally. No wheezes. No rhonchi.  Cardiovascular: S1, S2. Regular rate and rhythm. No murmurs, no rubs or gallops. Equal pulses.   Abdomen: Soft, nontender, nondistended.  Positive bowel sounds. No rebound, guarding or organomegaly.  Neurologic: No focal neurological deficits. CNII-XII grossly intact.  Extremities: No edema or cyanosis.      Diagnostic Data:      Labs:  Recent Labs   Lab 06/02/24  0934   WBC 5.7   HGB 16.2   MCV 82.9   .0*       Recent Labs   Lab 06/02/24  0934   *   BUN 16   CREATSERUM 1.21   CA 9.3   ALB 4.7      K 4.1      CO2 26.0   ALKPHO 69   AST 28   ALT 20   BILT 1.0   TP 7.4       Estimated Creatinine Clearance: 44.8 mL/min (based on SCr of 1.21 mg/dL).    No results for input(s): \"PTP\", \"INR\" in the last 168 hours.    No results for input(s): \"TROP\", \"CK\" in the last 168 hours.    Imaging: Imaging data reviewed in Epic.      ASSESSMENT / PLAN:     Chest pain - cardiac vs MSK  Started on exertion  EKG reviewed  Trop WNL  Rule out ACS with serial trop and EKGs  Cardiology on consult  Echo  Possible stress test in a.m. if negative trops  HTN  BP mildly elevated  Cardiology on consult  Start losartan 50 mg daily  Close opt follow up  HLD  Continue statin      Quality:  DVT Prophylaxis: Heparin s/c  CODE status: Not on file    Plan of care discussed with ED physician    Sonja Montaño MD  6/2/2024                  The 21st Century Cures Act makes medical notes like these available to patients in the interest of transparency. Please be advised this is a medical document. Medical documents are intended to carry relevant information, facts as evident, and the clinical opinion of the practitioner. The medical note is intended as peer to peer communication and may appear blunt or direct. It is written in  medical language and may contain abbreviations or verbiage that are unfamiliar.

## 2024-06-02 NOTE — ED PROVIDER NOTES
Patient Seen in: [facility] Emergency Department    History     Chief Complaint   Patient presents with    Chest Pain Angina     Stated Complaint: abd pain    HPI    79 year old male presenting with chest pain. Pain began last night, l sided pleuritic pain, persisting. No fever. No cough.  No sig sob, no trauma. No hx pe. No immobilization. . The patient describes the pain as sharp,*. Patient rates pain as a 4/10 in intensity.  Associated symptoms are none.  Aggravating factors are inspiration.  Alleviating factors are:  none.   Patient's cardiac risk factors are age, htn.    Patient's risk factors for DVT/PE: denies.      Past Medical History:    Bunion    Hyperlipidemia    Hypertension    Kidney cysts    Low back pain    Pulmonary nodules    Sciatica    Thrombocytopenia (HCC)       Past Surgical History:   Procedure Laterality Date    Colonoscopy  2014    Dental surgery procedure  2015    extraction; no associated bleeding complications    Inguinal hernia repair Right 11/17/2017            Family History   Problem Relation Age of Onset    Lipids Other     Other (Vascular disease) Other     Cancer Paternal Uncle         uncertain etiology    Stroke Paternal Uncle     Bleeding Disorders Neg     Clotting Disorder Neg        Social History     Socioeconomic History    Marital status: Single    Number of children: 5   Occupational History    Occupation: production     Employer: [employer]     Comment: retired   Tobacco Use    Smoking status: Never    Smokeless tobacco: Never   Vaping Use    Vaping status: Never Used   Substance and Sexual Activity    Alcohol use: Not Currently     Comment: christian heavy alcohol use; now no longer drinks    Drug use: No    Sexual activity: Yes   Other Topics Concern    Caffeine Concern Yes   Social History Narrative    Pepito is  x 10 yrs. Retired from working to work at [employer]. He lives alone in [place], [place]. Father of 5 adult children.        Review of Systems    Positive for stated complaint: abd pain  Other systems are as noted in HPI.  Constitutional and vital signs reviewed.      All other systems reviewed and negative except as noted above.    PSFH elements reviewed from today and agreed except as otherwise stated in HPI.    Physical Exam     ED Triage Vitals [06/02/24 0904]   BP (!) 174/76   Pulse 66   Resp 18   Temp 97.8 °F (36.6 °C)   Temp src Oral   SpO2 97 %   O2 Device None (Room air)       Current:BP (!) 114/101   Pulse 54   Temp 97.8 °F (36.6 °C) (Oral)   Resp 17   Ht 170.2 cm (5' 7\")   Wt 83.9 kg   SpO2 99%   BMI 28.98 kg/m²   PULSE OX nl        Physical Exam    Constitutional: awake alert no distress  HENT: mmm, no lesions,  Neck: normal range of motion, no tenderness, supple.  Eyes: PERRL, EOMI, conjunctiva normal, no discharge. Sclera anicteric.  Cardiovascular: rr no rub  Respiratory: Normal breath sounds, no respiratory distress, no wheezing, no chest tenderness.  GI: Bowel sounds normal, Soft, no tenderness, no masses, no pulsatile masses.  : No CVA tenderness.  Skin: Warm, dry, no erythema, no rash.  Musculoskeletal: Intact distal pulses, no edema, no tenderness, no cyanosis, no clubbing. Good range of motion in all major joints. No tenderness to palpation or major deformities noted. Back- No tenderness.  Neurologic: Alert & oriented x 3, normal motor function, normal sensory function, no focal deficits noted.  Psych: Calm, cooperative, nl affect    Differential diagnosis to include Acute coronary syndrome vs. Acute pulmonary process including pneumothorax vs. Dissection vs.  pleurisy vs. PE vs. Pneumonia/effusion vs. GI related pathology such as GERD or gastritis, vs. Musculoskeletal        ED Course     Labs Reviewed   BASIC METABOLIC PANEL (8) - Abnormal; Notable for the following components:       Result Value    Glucose 105 (*)     Calculated Osmolality 296 (*)     All other components within normal limits   CBC  W/ DIFFERENTIAL - Abnormal; Notable for the following components:    RBC 5.84 (*)     .0 (*)     All other components within normal limits   HEPATIC FUNCTION PANEL (7) - Normal   TROPONIN I HIGH SENSITIVITY - Normal   D-DIMER - Normal   BNP (B TYPE NATRIURETIC PEPTIDE) - Normal   TROPONIN I HIGH SENSITIVITY - Normal   CBC WITH DIFFERENTIAL WITH PLATELET    Narrative:     The following orders were created for panel order CBC With Differential With Platelet.  Procedure                               Abnormality         Status                     ---------                               -----------         ------                     CBC W/ DIFFERENTIAL[[id number]]          Abnormal            Final result                 Please view results for these tests on the individual orders.       MDM     Monitor Interpretation:  Nsr 78    Radiology Interpretation:  XR CHEST AP PORTABLE  (CPT=71045)    Result Date: 6/2/2024  CONCLUSION:   No new focal opacity, pleural effusion, or pneumothorax.  No significant change in the minimal left basilar atelectasis/scarring.  Redemonstrated enlarged cardiomediastinal silhouette.    Dictated by (CST): George Valle MD on 6/02/2024 at 10:08 AM     Finalized by (CST): George Valle MD on 6/02/2024 at 10:10 AM           I reviewed xray noted no infiltrates no pneumothorax          HEART score for chest pain  History- (Highly suspicious 2pt, Mod 1pt, slightly 0pt)        1  ECG- (significant ST deviation 2pt, Non spec 1pt, nl 0pt)  1  AGE- (>65 2pt, 45-64 1 pt, Under 45 0 pt)    2  Risk Factors- ( DM,HTN,Chol, fhx CAD, BMI>30, hx CAD)  ( >3 or hx CAD 2pt, 1-2 risks 1pt, None 0pt)  1  Troponin- ( 3 times nl 2pt, 2 times nl 1pt, nl 0pt   0         TOTAL  5    SCORE 0-3: 2.5% MACE over next 6 weeks consider D/C outpatient F/U  SCORE 4-6: 20.3% MACE over next 6 weeks consider admit  SCORE 7-10:72.7% MACE over next 6 weeks consider early invasive strategy.    Patient has a HEART score of  5, plan will be cardiology consulted evaluated patient in ER.  Plan to admit serial enzymes.    Armond AJ, Giovanny BE, Tim CAMERON. Chest pain in the emergency room: value of the HEART score. Neth Heart J. 2008 Manuel;16(6):191-6. PubMed PMID: [id number]; PubMed Central PMCID: [id number].  Aortic Dissection Risk Assesment:    High risk Conditions (Marfan, Fhx,Known aortic valve disease, known dissection or recent aortic manipulation) no  High Risk Pain Features (Severe. Abrupt Ripping or tearing) no  High Risk Exam Features (pulse deficit, BP difference, focal neuro deficit,murmur of aortic insufficiency, hypotension or shock) no     Medical Decision Making  Problems Addressed:  Acute coronary syndrome (HCC): acute illness or injury    Amount and/or Complexity of Data Reviewed  External Data Reviewed: ECG.     Details: No old EKG available for comparison  Labs: ordered. Decision-making details documented in ED Course.  Radiology: ordered and independent interpretation performed. Decision-making details documented in ED Course.  ECG/medicine tests: ordered and independent interpretation performed. Decision-making details documented in ED Course.  Discussion of management or test interpretation with external provider(s): Patient declines admission aware sig concern for acute coronary event.  He is aware.    Risk  Decision regarding hospitalization.      EKG-nsr rate 62 st changes ant with lt flipped t waves, lvh vs. Ischemic changes      Disposition and Plan     Clinical Impression:  1. Acute coronary syndrome (HCC)        Disposition:  [place]    Follow-up:  No follow-up provider specified.    Medications Prescribed:  There are no discharge medications for this patient.

## 2024-06-02 NOTE — ED INITIAL ASSESSMENT (HPI)
Pt presents to the ER with c/o RUQ pain since last night. Denies urinary and bowel issues. Denies n/v

## 2024-06-06 ENCOUNTER — OFFICE VISIT (OUTPATIENT)
Facility: CLINIC | Age: 79
End: 2024-06-06

## 2024-06-06 VITALS
DIASTOLIC BLOOD PRESSURE: 80 MMHG | HEART RATE: 58 BPM | WEIGHT: 182 LBS | SYSTOLIC BLOOD PRESSURE: 130 MMHG | BODY MASS INDEX: 28.56 KG/M2 | OXYGEN SATURATION: 98 % | HEIGHT: 67 IN

## 2024-06-06 DIAGNOSIS — D69.6 THROMBOCYTOPENIA (HCC): Primary | ICD-10-CM

## 2024-06-06 DIAGNOSIS — N28.1 KIDNEY CYST, ACQUIRED: ICD-10-CM

## 2024-06-06 DIAGNOSIS — G89.29 CHRONIC RIGHT-SIDED LOW BACK PAIN WITH RIGHT-SIDED SCIATICA: ICD-10-CM

## 2024-06-06 DIAGNOSIS — E78.2 MIXED HYPERLIPIDEMIA: ICD-10-CM

## 2024-06-06 DIAGNOSIS — R94.31 ABNORMAL EKG: ICD-10-CM

## 2024-06-06 DIAGNOSIS — R73.03 PREDIABETES: ICD-10-CM

## 2024-06-06 DIAGNOSIS — M54.41 CHRONIC RIGHT-SIDED LOW BACK PAIN WITH RIGHT-SIDED SCIATICA: ICD-10-CM

## 2024-06-06 DIAGNOSIS — I70.0 ATHEROSCLEROSIS OF AORTA (HCC): ICD-10-CM

## 2024-06-06 DIAGNOSIS — R07.89 ATYPICAL CHEST PAIN: ICD-10-CM

## 2024-06-06 DIAGNOSIS — I10 ESSENTIAL HYPERTENSION: ICD-10-CM

## 2024-06-06 PROBLEM — N18.30 CKD (CHRONIC KIDNEY DISEASE) STAGE 3, GFR 30-59 ML/MIN (HCC): Chronic | Status: RESOLVED | Noted: 2023-12-05 | Resolved: 2024-06-06

## 2024-06-06 PROBLEM — I24.9 ACUTE CORONARY SYNDROME (HCC): Status: RESOLVED | Noted: 2024-06-02 | Resolved: 2024-06-06

## 2024-06-06 RX ORDER — ACETAMINOPHEN 325 MG/1
325 TABLET ORAL EVERY 6 HOURS PRN
COMMUNITY

## 2024-06-06 NOTE — PROGRESS NOTES
Subjective:   Pepito Mendoza is a 79 year old male who presents for a MA (Medicare Advantage) Supervisit (Once per calendar year) and scheduled follow up of multiple significant but stable problems and acute uncomplicated problem.   79-year-old gentleman here for Medicare annual wellness visit.  He report that he is doing well.  He was in emergency room for chest pain and left AMA.  His EKG was abnormal.  Both troponins are negative.  D-dimer was negative.  He reports that chest pain happened after lifting weight.  Pain is reproducible with twisting.  Denies any exertional chest pain or shortness of breath.  Denies any diaphoresis.    History/Other:   Fall Risk Assessment:   He has been screened for Falls and is low risk.      Cognitive Assessment:   Abnormal  What day of the week is this?: Correct  What month is it?: Incorrect  What year is it?: Correct  Recall \"Ball\": Incorrect  Recall \"Flag\": Incorrect  Recall \"Tree\": Incorrect    Functional Ability/Status:   Pepito Mendoza has a completely normal functional assessment. See flowsheet for details.      Depression Screening (PHQ-2/PHQ-9): PHQ-2 SCORE: 0  , done 6/6/2024   Last Donna Suicide Screening on 6/2/2024 was No Risk.     <5 minutes spent screening and counseling for depression    Advanced Directives:   He does NOT have a Living Will. [Do you have a living will?: No]  He does NOT have a Power of  for Health Care. [Do you have a healthcare power of ?: No]  Discussed Advance Care Planning with patient (and family/surrogate if present). Standard forms made available to patient in After Visit Summary.      Patient Active Problem List   Diagnosis    Mixed hyperlipidemia    Thrombocytopenia (HCC)    Atherosclerosis of aorta (HCC)    Kidney cyst, acquired    Essential hypertension    Chronic right-sided low back pain with right-sided sciatica    Prediabetes     Allergies:  He has No Known Allergies.    Current Medications:  Outpatient  Medications Marked as Taking for the 6/6/24 encounter (Office Visit) with Anjel Márquez MD   Medication Sig    acetaminophen 325 MG Oral Tab Take 1 tablet (325 mg total) by mouth every 6 (six) hours as needed for Pain.       Medical History:  He  has a past medical history of Bunion (2006), Hyperlipidemia, Hypertension, Kidney cysts, Low back pain, Pulmonary nodules, Sciatica, and Thrombocytopenia (HCC).  Surgical History:  He  has a past surgical history that includes dental surgery procedure (2015); Inguinal hernia repair (Right, 11/17/2017); and colonoscopy (2014).   Family History:  His family history includes Cancer in his paternal uncle; Lipids in an other family member; Stroke in his paternal uncle; Vascular disease in an other family member.  Social History:  He  reports that he has never smoked. He has never used smokeless tobacco. He reports that he does not currently use alcohol. He reports that he does not use drugs.    Tobacco:  He has never smoked tobacco.    CAGE Alcohol Screen:   CAGE screening score of 0 on 6/6/2024, showing low risk of alcohol abuse.      Patient Care Team:  Anjel Márquez MD as PCP - General (Internal Medicine)  Chanel Tinajero PT as Physical Therapist (Physical Therapy)    Review of Systems   Constitutional:  Negative for activity change, appetite change and fever.   HENT:  Negative for congestion and voice change.    Respiratory:  Negative for cough and shortness of breath.    Cardiovascular:  Negative for chest pain.   Gastrointestinal:  Negative for abdominal distention, abdominal pain and vomiting.   Genitourinary:  Negative for hematuria.   Skin:  Negative for wound.   Psychiatric/Behavioral:  Negative for behavioral problems.          Objective:   Physical Exam  Constitutional:       Appearance: Normal appearance.   HENT:      Head: Normocephalic.   Eyes:      Conjunctiva/sclera: Conjunctivae normal.   Cardiovascular:      Rate and Rhythm: Normal rate and regular  rhythm.      Heart sounds: Normal heart sounds. No murmur heard.  Pulmonary:      Effort: Pulmonary effort is normal.      Breath sounds: Normal breath sounds. No rhonchi or rales.   Abdominal:      General: Bowel sounds are normal.      Palpations: Abdomen is soft.      Tenderness: There is no abdominal tenderness.   Musculoskeletal:      Cervical back: Neck supple.      Right lower leg: No edema.      Left lower leg: No edema.   Skin:     General: Skin is warm and dry.   Neurological:      General: No focal deficit present.      Mental Status: He is alert and oriented to person, place, and time. Mental status is at baseline.   Psychiatric:         Mood and Affect: Mood normal.         Behavior: Behavior normal.         /80   Pulse 58   Ht 5' 7\" (1.702 m)   Wt 182 lb (82.6 kg)   SpO2 98%   BMI 28.51 kg/m²  Estimated body mass index is 28.51 kg/m² as calculated from the following:    Height as of this encounter: 5' 7\" (1.702 m).    Weight as of this encounter: 182 lb (82.6 kg).    Medicare Hearing Assessment:   Hearing Screening    Time taken: 6/6/2024 10:40 AM  Screening Method: Questionnaire  I have a problem hearing over the telephone: No I have trouble following the conversations when two or more people are talking at the same time: No   I have trouble understanding things on the TV: No I have to strain to understand conversations: No   I have to worry about missing the telephone ring or doorbell: No I have trouble hearing conversations in a noisy background such as a crowded room or restaurant: No   I get confused about where sounds come from: No I misunderstand some words in a sentence and need to ask people to repeat themselves: No   I especially have trouble understanding the speech of women and children: No I have trouble understanding the speaker in a large room such as at a meeting or place of Buddhist: No   Many people I talk to seem to mumble (or don't speak clearly): No People get annoyed  because I misunderstand what they say: No   I misunderstand what others are saying and make inappropriate responses: No I avoid social activities because I cannot hear well and fear I will reply improperly: No   Family members and friends have told me they think I may have hearing loss: No                   Assessment & Plan:   Pepito Mendoza is a 79 year old male who presents for a Medicare Assessment.     1. Thrombocytopenia (HCC) (Primary) monitor  2. Atherosclerosis of aorta (HCC)-he does not want to be on medication.  3. Mixed hyperlipidemia diet controlled  4. Essential hypertension diet controlled  5. Prediabetes diet controlled  6. Chronic right-sided low back pain with right-sided sciatica stable  7. Kidney cyst, acquired stable    Additional evaluation apart from Medicare advantage super visit  8. Atypical chest pain-I do not think pain is anginal related.  However, because of his age, risk factors, abnormal EKG will rule out occult cardiac disease.  Stress test ordered.  Instructed to go back to emergency room in case of worsening symptoms.  -     CARD NUC EXERCISE STRESS (REST/EXER) (CPT 17162); Future; Expected date: 06/06/2024  9. Abnormal EKG  -     CARD NUC EXERCISE STRESS (REST/EXER) (CPT 54091); Future; Expected date: 06/06/2024    The patient indicates understanding of these issues and agrees to the plan.  Reinforced healthy diet, lifestyle, and exercise.      No follow-ups on file.     Anjel Márquez MD, 6/6/2024     Supplementary Documentation:   General Health:  In the past six months, have you lost more than 10 pounds without trying?: 2 - No  Has your appetite been poor?: No  Type of Diet: Balanced;Low Salt  How does the patient maintain a good energy level?: Appropriate Exercise  How would you describe your daily physical activity?: Moderate  How would you describe your current health state?: Good  How do you maintain positive mental well-being?: Social Interaction  On a scale of 0 to 10,  with 0 being no pain and 10 being severe pain, what is your pain level?: 2 - (Mild)  In the past six months, have you experienced urine leakage?: 0-No  At any time do you feel concerned for the safety/well-being of yourself and/or your children, in your home or elsewhere?: No  Have you had any immunizations at another office such as Influenza, Hepatitis B, Tetanus, or Pneumococcal?: Maddie Mendoza's SCREENING SCHEDULE   Tests on this list are recommended by your physician but may not be covered, or covered at this frequency, by your insurer.   Please check with your insurance carrier before scheduling to verify coverage.   PREVENTATIVE SERVICES FREQUENCY &  COVERAGE DETAILS LAST COMPLETION DATE   Diabetes Screening    Fasting Blood Sugar / Glucose    One screening every 12 months if never tested or if previously tested but not diagnosed with pre-diabetes   One screening every 6 months if diagnosed with pre-diabetes Lab Results   Component Value Date     (H) 06/02/2024        Cardiovascular Disease Screening    Lipid Panel  Cholesterol  Lipoprotein (HDL)  Triglycerides Covered every 5 years for all Medicare beneficiaries without apparent signs or symptoms of cardiovascular disease Lab Results   Component Value Date    CHOLEST 201 (H) 12/05/2023    HDL 60 (H) 12/05/2023     (H) 12/05/2023    TRIG 122 12/05/2023         Electrocardiogram (EKG)   Covered if needed at Welcome to Medicare, and non-screening if indicated for medical reasons 06/02/2024      Ultrasound Screening for Abdominal Aortic Aneurysm (AAA) Covered once in a lifetime for one of the following risk factors    Men who are 65-75 years old and have ever smoked    Anyone with a family history -     Colorectal Cancer Screening  Covered for ages 50-85; only need ONE of the following:    Colonoscopy   Covered every 10 years    Covered every 2 years if patient is at high risk or previous colonoscopy was abnormal -    No recommendations  at this time    Flexible Sigmoidoscopy   Covered every 4 years -    Fecal Occult Blood Test Covered annually -   Prostate Cancer Screening    Prostate-Specific Antigen (PSA) Annually Lab Results   Component Value Date    PSA 0.8 10/29/2018     There are no preventive care reminders to display for this patient.   Immunizations    Influenza Covered once per flu season  Please get every year -  No recommendations at this time    Pneumococcal Each vaccine ([id number] & [id number]) covered once after 65 Prevnar 13: 11/14/2019    [id number]: 01/20/2022     No recommendations at this time    Hepatitis B One screening covered for patients with certain risk factors   -  No recommendations at this time    Tetanus Toxoid Not covered by Medicare Part B unless medically necessary (cut with metal); may be covered with your pharmacy prescription benefits -    Tetanus, Diptheria and Pertusis TD and TDaP Not covered by Medicare Part B -  No recommendations at this time    Zoster Not covered by Medicare Part B; may be covered with your pharmacy  prescription benefits -  Zoster Vaccines(1 of 2) Never done

## 2025-03-25 ENCOUNTER — OFFICE VISIT (OUTPATIENT)
Facility: CLINIC | Age: 80
End: 2025-03-25
Payer: MEDICARE

## 2025-03-25 VITALS
BODY MASS INDEX: 26.21 KG/M2 | DIASTOLIC BLOOD PRESSURE: 82 MMHG | WEIGHT: 167 LBS | HEIGHT: 67 IN | HEART RATE: 81 BPM | OXYGEN SATURATION: 98 % | SYSTOLIC BLOOD PRESSURE: 136 MMHG

## 2025-03-25 DIAGNOSIS — Z12.11 COLON CANCER SCREENING: ICD-10-CM

## 2025-03-25 DIAGNOSIS — D69.6 THROMBOCYTOPENIA: ICD-10-CM

## 2025-03-25 DIAGNOSIS — Z00.00 ENCOUNTER FOR ANNUAL HEALTH EXAMINATION: Primary | ICD-10-CM

## 2025-03-25 DIAGNOSIS — N28.1 KIDNEY CYST, ACQUIRED: ICD-10-CM

## 2025-03-25 DIAGNOSIS — I70.0 ATHEROSCLEROSIS OF AORTA: ICD-10-CM

## 2025-03-25 DIAGNOSIS — E78.2 MIXED HYPERLIPIDEMIA: ICD-10-CM

## 2025-03-25 DIAGNOSIS — I10 ESSENTIAL HYPERTENSION: ICD-10-CM

## 2025-03-25 DIAGNOSIS — Z12.5 PROSTATE CANCER SCREENING: ICD-10-CM

## 2025-03-25 DIAGNOSIS — R73.03 PREDIABETES: ICD-10-CM

## 2025-03-25 PROBLEM — G89.29 CHRONIC RIGHT-SIDED LOW BACK PAIN WITH RIGHT-SIDED SCIATICA: Status: RESOLVED | Noted: 2021-04-01 | Resolved: 2025-03-25

## 2025-03-25 PROBLEM — M54.41 CHRONIC RIGHT-SIDED LOW BACK PAIN WITH RIGHT-SIDED SCIATICA: Status: RESOLVED | Noted: 2021-04-01 | Resolved: 2025-03-25

## 2025-03-25 PROCEDURE — 99203 OFFICE O/P NEW LOW 30 MIN: CPT | Performed by: FAMILY MEDICINE

## 2025-03-25 PROCEDURE — G0439 PPPS, SUBSEQ VISIT: HCPCS | Performed by: FAMILY MEDICINE

## 2025-03-25 NOTE — PROGRESS NOTES
Subjective:   Pepito Mendoza is a 80 year old male who presents for a MA AHA (Medicare Advantage Annual Health Assessment) and Subsequent Annual Wellness visit (Pt already had Initial Annual Wellness) and scheduled follow up of multiple significant but stable problems.     No prior daily prescription medications per patient.    Feels well overall today w/o complaints.  Denies any chest pain/tightness, SOB, LH, dizziness, or peripheral edema.    Hx of thrombocytopenia. He denies every seeing hematology although did see Dr. Gómez in 2017. No abnormal bleeding or bruising.     Is active. Bowls.   Eats a balanced diet. Good appetite.   Regular BM.     Last colonoscopy: Never. Per patient, no prior CRC screening.   Last PSA: Overdue  Vaccines: To consider Shingrix.     History/Other:   Fall Risk Assessment:   He has been screened for Falls and is low risk.      Cognitive Assessment:   Abnormal  What day of the week is this?: Correct  What month is it?: Correct  What year is it?: Correct  Recall \"Ball\": Incorrect  Recall \"Flag\": Incorrect  Recall \"Tree\": Correct      Functional Ability/Status:   Pepito Mendoza has some abnormal functions as listed below:  He has Toileting difficulties based on screening of functional status.He has Vision problems based on screening of functional status.       Depression Screening (PHQ):  PHQ-2 SCORE: 0  , done 3/25/2025             Advanced Directives:   He does have a Living Will but we do NOT have it on file in Epic.    He does NOT have a Power of  for Health Care. [Do you have a healthcare power of ?: No]  Not discussed      Patient Active Problem List   Diagnosis    Mixed hyperlipidemia    Thrombocytopenia    Atherosclerosis of aorta    Kidney cyst, acquired    Essential hypertension    Prediabetes     Allergies:  He has No Known Allergies.    Current Medications:  No outpatient medications have been marked as taking for the 3/25/25 encounter (Office Visit) with  Masoud Shannon DO.       Medical History:  He  has a past medical history of Bunion (2006), Hyperlipidemia, Hypertension, Kidney cysts, Low back pain, Pulmonary nodules, Sciatica, and Thrombocytopenia.  Surgical History:  He  has a past surgical history that includes dental surgery procedure (2015); Inguinal hernia repair (Right, 11/17/2017); and colonoscopy (2014).   Family History:  His family history includes Cancer in his paternal uncle; Lipids in an other family member; Stroke in his paternal uncle; Vascular disease in an other family member.  Social History:  He  reports that he has never smoked. He has never used smokeless tobacco. He reports that he does not currently use alcohol. He reports that he does not use drugs.    Tobacco:  He has never smoked tobacco.    CAGE Alcohol Screen:   He has been screened for alcohol abuse and his score is not 0:  Cut: Have you ever felt you should Cut down on your drinking?: No  Annoyed: Have people Annoyed you by criticizing your drinking?: No  Guilty: Have you ever felt bad or Guilty about your drinking?: Yes  Eye Opener: Have you ever had a drink first thing in the morning to steady your nerves or to get rid of a hangover (Eye opener)?: No  Total Score: 1      Patient Care Team:  Anjel Márquez MD as PCP - General (Internal Medicine)  Chanel Tinajero PT as Physical Therapist (Physical Therapy)    Review of Systems  GENERAL: feels well otherwise  SKIN: denies any unusual skin lesions  EYES: denies blurred vision or double vision  HEENT: denies nasal congestion, sinus pain or ST  LUNGS: denies shortness of breath with exertion  CARDIOVASCULAR: denies chest pain on exertion  GI: denies abdominal pain, denies heartburn  : no complaint of urinary incontinence  MUSCULOSKELETAL: denies back pain  NEURO: denies headaches  PSYCHE: denies depression or anxiety  HEMATOLOGIC: denies hx of anemia  ENDOCRINE: denies thyroid history  ALL/ASTHMA: denies hx of allergy or  asthma    Objective:   Physical Exam  General Appearance:  Alert, cooperative, no distress, appears stated age   Head:  Normocephalic, without obvious abnormality, atraumatic   Eyes:  PERRL, conjunctiva/corneas clear, EOM's intact, both eyes   Ears:  Normal TM's and external ear canals, both ears   Nose: Nares normal, septum midline, mucosa normal, no drainage or sinus tenderness   Throat: Lips, mucosa, and tongue normal; teeth and gums normal   Neck: Supple, symmetrical, trachea midline, no adenopathy, thyroid: not enlarged, symmetric, no tenderness/mass/nodules, no carotid bruit or JVD   Back:   Symmetric, no curvature, ROM normal, no CVA tenderness   Lungs:   Clear to auscultation bilaterally, respirations unlabored   Chest Wall:  No tenderness or deformity   Heart:  Regular rate and rhythm, S1, S2 normal, no murmur, rub or gallop   Abdomen:   Soft, non-tender, bowel sounds active all four quadrants,  no masses, no organomegaly           Extremities: Extremities normal, atraumatic, no cyanosis or edema   Pulses: 2+ and symmetric   Skin: Skin color, texture, turgor normal, no rashes or lesions   Lymph nodes: Cervical, supraclavicular, and axillary nodes normal   Neurologic: Normal     /82   Pulse 81   Ht 5' 7\" (1.702 m)   Wt 167 lb (75.8 kg)   SpO2 98%   BMI 26.16 kg/m²  Estimated body mass index is 26.16 kg/m² as calculated from the following:    Height as of this encounter: 5' 7\" (1.702 m).    Weight as of this encounter: 167 lb (75.8 kg).    Medicare Hearing Assessment:   Hearing Screening    Screening Method: Finger Rub  Finger Rub Result: Pass         Visual Acuity:   Right Eye Visual Acuity: Corrected Right Eye Chart Acuity: 20/25   Left Eye Visual Acuity: Corrected Left Eye Chart Acuity: 20/25   Both Eyes Visual Acuity: Corrected Both Eyes Chart Acuity: 20/25   Able To Tolerate Visual Acuity: Yes        Assessment & Plan:   Pepito Mendoza is a 80 year old male who presents for a Medicare  Assessment.     1. Encounter for annual health examination (Primary)  -     Hemoglobin A1C  -     CBC With Differential With Platelet  -     Comp Metabolic Panel (14)  -     PSA Total, Screen; Future; Expected date: 03/25/2025  -     Lipid Panel  2. Essential hypertension  -     Hemoglobin A1C  -     CBC With Differential With Platelet  -     Comp Metabolic Panel (14)  -     Lipid Panel  3. Mixed hyperlipidemia  -     Hemoglobin A1C  -     CBC With Differential With Platelet  -     Comp Metabolic Panel (14)  -     Lipid Panel  4. Prediabetes  -     Hemoglobin A1C  -     CBC With Differential With Platelet  -     Comp Metabolic Panel (14)  -     Lipid Panel  5. Atherosclerosis of aorta  -     Lipid Panel  6. Thrombocytopenia  -     CBC With Differential With Platelet  7. Kidney cyst, acquired  -     Comp Metabolic Panel (14)  8. Colon cancer screening  -     Gastro Referral - [facility] ([facility])  9. Prostate cancer screening  -     PSA Total, Screen; Future; Expected date: 03/25/2025    -Reviewed prior labs.  -HTN: Well-controlled. Diet-controlled. F/u labs.  -HLD & Atherosclerosis of aorta: F/u lipids.   -Prediabetes: Lifestyle changes. Low-card diet. Repeat A1c.  -Thrombocytopenia: Repeat CBC w/ diff. Consider hematology f/u pending results.   -Left kidney simple cysts: Noted on CTAP in 2022. No concerns.   -Baseline labs ordered.  -GI referral for CRC screening.     The patient indicates understanding of these issues and agrees to the plan.  Lab work ordered.  Reinforced healthy diet, lifestyle, and exercise.      No follow-ups on file.     Masoud Shannon DO, 3/25/2025     Supplementary Documentation:   General Health:  In the past six months, have you lost more than 10 pounds without trying?: 2 - No  Has your appetite been poor?: No  Type of Diet: Low Carb  How does the patient maintain a good energy level?: Daily Walks  How would you describe your daily physical activity?: Moderate  How would you  describe your current health state?: Fair  How do you maintain positive mental well-being?: Social Interaction  On a scale of 0 to 10, with 0 being no pain and 10 being severe pain, what is your pain level?: 5 - (Moderate)  In the past six months, have you experienced urine leakage?: 0-No  At any time do you feel concerned for the safety/well-being of yourself and/or your children, in your home or elsewhere?: No  Have you had any immunizations at another office such as Influenza, Hepatitis B, Tetanus, or Pneumococcal?: No    Health Maintenance   Topic Date Due    Zoster Vaccines (1 of 2) Never done    COVID-19 Vaccine (6 - 2024-25 season) 09/01/2024    Influenza Vaccine (1) 10/01/2024    Annual Well Visit  01/01/2025    Annual Depression Screening  01/01/2025    Fall Risk Screening (Annual)  01/01/2025    Pneumococcal Vaccine: 50+ Years  Completed    Meningococcal B Vaccine  Aged Out

## 2025-03-31 ENCOUNTER — LAB ENCOUNTER (OUTPATIENT)
Dept: LAB | Facility: REFERENCE LAB | Age: 80
End: 2025-03-31
Attending: FAMILY MEDICINE
Payer: MEDICARE

## 2025-03-31 DIAGNOSIS — Z12.5 PROSTATE CANCER SCREENING: ICD-10-CM

## 2025-03-31 DIAGNOSIS — Z00.00 ENCOUNTER FOR ANNUAL HEALTH EXAMINATION: ICD-10-CM

## 2025-03-31 LAB
ALBUMIN SERPL-MCNC: 4.2 G/DL (ref 3.2–4.8)
ALBUMIN/GLOB SERPL: 1.4 {RATIO} (ref 1–2)
ALP LIVER SERPL-CCNC: 59 U/L
ALT SERPL-CCNC: 31 U/L
ANION GAP SERPL CALC-SCNC: 6 MMOL/L (ref 0–18)
AST SERPL-CCNC: 35 U/L (ref ?–34)
BASOPHILS # BLD AUTO: 0.03 X10(3) UL (ref 0–0.2)
BASOPHILS NFR BLD AUTO: 0.8 %
BILIRUB SERPL-MCNC: 0.7 MG/DL (ref 0.2–1.1)
BUN BLD-MCNC: 12 MG/DL (ref 9–23)
BUN/CREAT SERPL: 9 (ref 10–20)
CALCIUM BLD-MCNC: 9 MG/DL (ref 8.7–10.4)
CHLORIDE SERPL-SCNC: 107 MMOL/L (ref 98–112)
CHOLEST SERPL-MCNC: 169 MG/DL (ref ?–200)
CO2 SERPL-SCNC: 29 MMOL/L (ref 21–32)
COMPLEXED PSA SERPL-MCNC: 0.67 NG/ML (ref ?–4)
CREAT BLD-MCNC: 1.33 MG/DL
DEPRECATED RDW RBC AUTO: 49.8 FL (ref 35.1–46.3)
EGFRCR SERPLBLD CKD-EPI 2021: 53 ML/MIN/1.73M2 (ref 60–?)
EOSINOPHIL # BLD AUTO: 0.05 X10(3) UL (ref 0–0.7)
EOSINOPHIL NFR BLD AUTO: 1.3 %
ERYTHROCYTE [DISTWIDTH] IN BLOOD BY AUTOMATED COUNT: 15.7 % (ref 11–15)
EST. AVERAGE GLUCOSE BLD GHB EST-MCNC: 123 MG/DL (ref 68–126)
FASTING PATIENT LIPID ANSWER: YES
FASTING STATUS PATIENT QL REPORTED: YES
GLOBULIN PLAS-MCNC: 2.9 G/DL (ref 2–3.5)
GLUCOSE BLD-MCNC: 100 MG/DL (ref 70–99)
HBA1C MFR BLD: 5.9 % (ref ?–5.7)
HCT VFR BLD AUTO: 46.8 %
HDLC SERPL-MCNC: 51 MG/DL (ref 40–59)
HGB BLD-MCNC: 14.8 G/DL
IMM GRANULOCYTES # BLD AUTO: 0 X10(3) UL (ref 0–1)
IMM GRANULOCYTES NFR BLD: 0 %
LDLC SERPL CALC-MCNC: 103 MG/DL (ref ?–100)
LYMPHOCYTES # BLD AUTO: 2 X10(3) UL (ref 1–4)
LYMPHOCYTES NFR BLD AUTO: 50 %
MCH RBC QN AUTO: 27.4 PG (ref 26–34)
MCHC RBC AUTO-ENTMCNC: 31.6 G/DL (ref 31–37)
MCV RBC AUTO: 86.7 FL
MONOCYTES # BLD AUTO: 0.66 X10(3) UL (ref 0.1–1)
MONOCYTES NFR BLD AUTO: 16.5 %
NEUTROPHILS # BLD AUTO: 1.26 X10 (3) UL (ref 1.5–7.7)
NEUTROPHILS # BLD AUTO: 1.26 X10(3) UL (ref 1.5–7.7)
NEUTROPHILS NFR BLD AUTO: 31.4 %
NONHDLC SERPL-MCNC: 118 MG/DL (ref ?–130)
OSMOLALITY SERPL CALC.SUM OF ELEC: 294 MOSM/KG (ref 275–295)
PLATELET # BLD AUTO: 119 10(3)UL (ref 150–450)
POTASSIUM SERPL-SCNC: 4.5 MMOL/L (ref 3.5–5.1)
PROT SERPL-MCNC: 7.1 G/DL (ref 5.7–8.2)
RBC # BLD AUTO: 5.4 X10(6)UL
SODIUM SERPL-SCNC: 142 MMOL/L (ref 136–145)
TRIGL SERPL-MCNC: 80 MG/DL (ref 30–149)
VLDLC SERPL CALC-MCNC: 13 MG/DL (ref 0–30)
WBC # BLD AUTO: 4 X10(3) UL (ref 4–11)

## 2025-03-31 PROCEDURE — 80061 LIPID PANEL: CPT | Performed by: FAMILY MEDICINE

## 2025-03-31 PROCEDURE — 36415 COLL VENOUS BLD VENIPUNCTURE: CPT | Performed by: FAMILY MEDICINE

## 2025-03-31 PROCEDURE — 83036 HEMOGLOBIN GLYCOSYLATED A1C: CPT | Performed by: FAMILY MEDICINE

## 2025-03-31 PROCEDURE — 80053 COMPREHEN METABOLIC PANEL: CPT | Performed by: FAMILY MEDICINE

## 2025-03-31 PROCEDURE — 85025 COMPLETE CBC W/AUTO DIFF WBC: CPT | Performed by: FAMILY MEDICINE

## 2025-05-17 ENCOUNTER — TELEPHONE (OUTPATIENT)
Dept: INTERNAL MEDICINE CLINIC | Facility: CLINIC | Age: 80
End: 2025-05-17

## 2025-05-17 NOTE — TELEPHONE ENCOUNTER
Called patient to schedule Annual Medicare Wellness Appointment.     After call made, noticed patient had Last MA Supervisit on 3/25/25 with Dr. Masoud Shannon.     Patient needs to call [employer] and update PCP info to reflect Dr. Shannon

## (undated) NOTE — LETTER
No referring provider defined for this encounter. 11/07/17        Patient: Wero Streeter   YOB: 1945   Date of Visit: 11/7/2017       Dear  Dr. Yadira Nogueira MD,      Thank you for referring Wero Streeter to my practice.   Please

## (undated) NOTE — LETTER
Patient: Adrian Ramos   YOB: 1945   Date of Visit: 4/20/2022     Dear  OSCAR Houser,    Thank you for referring Minor Lowing to my practice. Please find my assessment and plan below. Non-recurrent unilateral inguinal hernia without obstruction or gangrene  (primary encounter diagnosis)    76year old male with a slowly enlarging and mildly symptomatic L inguinal hernia. Thrombocytopenia stable. Discussed in detail with patient and options reviewed, literature provided. He is interested in repair, open with mesh, after he has recovered from his recent flare up of back pain, and understands risks. Regular medical follow up, repeat kidney function and other preop work up as indicated.       Sincerely,   Chasity Tinsley MD     Document electronically generated by:  Chasity Tinsley MD

## (undated) NOTE — LETTER
11/10/2017              Sasha Dukes 36         Dear Rosa Meredith,    This letter is to inform you that our office has made several attempts to reach you by phone without success.   We were attempting to c